# Patient Record
Sex: FEMALE | Race: WHITE | NOT HISPANIC OR LATINO | Employment: PART TIME | URBAN - NONMETROPOLITAN AREA
[De-identification: names, ages, dates, MRNs, and addresses within clinical notes are randomized per-mention and may not be internally consistent; named-entity substitution may affect disease eponyms.]

---

## 2017-03-09 ENCOUNTER — AMBULATORY - GICH (OUTPATIENT)
Dept: PHYSICAL THERAPY | Facility: OTHER | Age: 42
End: 2017-03-09

## 2017-03-09 DIAGNOSIS — M75.42 IMPINGEMENT SYNDROME OF LEFT SHOULDER: ICD-10-CM

## 2017-03-10 ENCOUNTER — HISTORY (OUTPATIENT)
Dept: FAMILY MEDICINE | Facility: OTHER | Age: 42
End: 2017-03-10

## 2017-03-10 ENCOUNTER — OFFICE VISIT - GICH (OUTPATIENT)
Dept: FAMILY MEDICINE | Facility: OTHER | Age: 42
End: 2017-03-10

## 2017-03-10 DIAGNOSIS — J34.89 OTHER SPECIFIED DISORDERS OF NOSE AND NASAL SINUSES: ICD-10-CM

## 2017-03-10 DIAGNOSIS — H92.01 OTALGIA OF RIGHT EAR: ICD-10-CM

## 2017-03-14 ENCOUNTER — HOSPITAL ENCOUNTER (OUTPATIENT)
Dept: PHYSICAL THERAPY | Facility: OTHER | Age: 42
Setting detail: THERAPIES SERIES
End: 2017-03-14

## 2017-03-14 DIAGNOSIS — M75.42 IMPINGEMENT SYNDROME OF LEFT SHOULDER: ICD-10-CM

## 2017-03-21 ENCOUNTER — HOSPITAL ENCOUNTER (OUTPATIENT)
Dept: PHYSICAL THERAPY | Facility: OTHER | Age: 42
Setting detail: THERAPIES SERIES
End: 2017-03-21

## 2017-03-31 ENCOUNTER — HOSPITAL ENCOUNTER (OUTPATIENT)
Dept: PHYSICAL THERAPY | Facility: OTHER | Age: 42
Setting detail: THERAPIES SERIES
End: 2017-03-31

## 2017-04-07 ENCOUNTER — HOSPITAL ENCOUNTER (OUTPATIENT)
Dept: PHYSICAL THERAPY | Facility: OTHER | Age: 42
Setting detail: THERAPIES SERIES
End: 2017-04-07

## 2017-05-16 ENCOUNTER — AMBULATORY - GICH (OUTPATIENT)
Dept: FAMILY MEDICINE | Facility: OTHER | Age: 42
End: 2017-05-16

## 2017-05-16 ENCOUNTER — HISTORY (OUTPATIENT)
Dept: FAMILY MEDICINE | Facility: OTHER | Age: 42
End: 2017-05-16

## 2017-05-16 ENCOUNTER — OFFICE VISIT - GICH (OUTPATIENT)
Dept: FAMILY MEDICINE | Facility: OTHER | Age: 42
End: 2017-05-16

## 2017-05-16 DIAGNOSIS — N63.0 BREAST LUMP: ICD-10-CM

## 2017-05-23 ENCOUNTER — HOSPITAL ENCOUNTER (OUTPATIENT)
Dept: RADIOLOGY | Facility: OTHER | Age: 42
End: 2017-05-23
Attending: FAMILY MEDICINE

## 2017-05-23 ENCOUNTER — HISTORY (OUTPATIENT)
Dept: RADIOLOGY | Facility: OTHER | Age: 42
End: 2017-05-23

## 2017-05-23 DIAGNOSIS — N63.0 BREAST LUMP: ICD-10-CM

## 2018-01-03 NOTE — PATIENT INSTRUCTIONS
Patient Information     Patient Name Yandy Lewis [id number] Female 1975      Patient Instructions by Sulma Lizarraga NP at 3/10/2017  3:45 PM     Author:  Sulma Lizarraga NP Service:  (none) Author Type:  PHYS- Nurse Practitioner     Filed:  3/10/2017  4:06 PM Encounter Date:  3/10/2017 Status:  Signed     :  Sulma Lizarraga NP (PHYS- Nurse Practitioner)            Nasacort daily   Warm packs a couple times daily  Follow up with primary care if symptoms are not improving

## 2018-01-03 NOTE — INITIAL ASSESSMENTS
Patient Information     Patient Name MRN Yandy Roberts [id number] Female 1975      Initial Assessments by Spring Navas PT at 3/14/2017  3:32 PM     Author:  Spring Navas PT Service:  (none) Author Type:  PT- Physical Therapist     Filed:  3/14/2017  5:07 PM Date of Service:  3/14/2017  3:32 PM Status:  Signed     :  Spring Navas PT (PT- Physical Therapist)            [facility]  Outpatient PT - Initial Evaluation  Upper Extremity    Date of Service: 3/14/2017     Visit #: 1 of 10    Patient Name: Yandy Light   YOB: 1975   Referring MD/Provider: Johnson Arvizu NP  Medical and Treatment Diagnosis: Left AC impingement  PT Treatment Diagnosis: left shoulder impingement, cervical restriction, dural tension, soft tissue hypomobility  Insurance: Other: IM Care  Start of Service: 17  Certification Dates: Start of Service: 17  Medicare/MA Re-Cert Due: 17     Living Situations:  Independent in Living Situation     Preadmission Functional Mobility: Independent   Precautions:  none  Cognition:  Oriented to Person, Place, and Time.     Were cultural / age or other special adaptations needed? 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 Has several children as well, one with spina bifida who takes some extra caring for which leaves less time for exercising for herself.    Rates pain: 0/10, 2/10 with certain movement, at times 6/10   Describes pain: achy sometimes, sharp shooting  Locates pain: lateral shoulder/ delt    Aggravating: child's pose/ overhead reach, reaching behind  Easing: rest/ avoiding motions      Current functional limitations: reaching behind  back, yoga poses    Prior Level:  Minimal to no difficulty completing the above functional activities.     Past Medical History      Diagnosis   Date     Allergic rhinitis, cause unspecified       Closed fracture of triquetral (cuneiform) bone of wrist       bilateral wrist fractures at age 17      Fracture of fifth toe, left, closed       History of pregnancy       Para 2-1-0-4  - twins at 34 weeks and 36 weeks      Other specified disorder of kidney and ureter       as child, had recurrent UTIs as a child      Severe pre-eclampsia, unspecified as to episode of care       HELLP syndrome.  Delivered at 33 weeks.      TUBAL LIGATION  2007     [facility] method      Unspecified hemorrhoids without mention of complication  2007     Past Surgical History       Procedure   Laterality Date      section   04      Section       Wrist fracture tx        Pr anes kidney prox ureter surg        Anesthesia alert        Notes prior complications from anesthesia: 'takes a long time to wake up       Past surgical history        Denies any history of surgical complications.         section   2007      at [facility], delivered at 36 weeks.  Planned due to one of the twins with coarctation of aorta & spina bifida       Tubal ligation        Lipectomy        with repair of diastasis recti       Occupation:      Previous Treatment:    Pain Meds / Anti-inflammatory Meds  - no  Physical Therapy - no  Injections - no  Surgery - no  Pain Clinic   No    Diagnostics:  Reviewed (see chart)   Current Medications:  Reviewed (see chart)    Drug Allergies:  Reviewed (see chart)  ?   Latex Allergy:  No     Objective    Items left blank indicate that the test was inappropriate or not meaningful at the time of evaluation and therefore not performed.    Fall Risk Screening: No risk factors identified    ROM / Strength:  Full range of motion and cardinal motion strength  5/5.   Posterior shoulder strength:   Lower trapezius 4-/5   Rhomboids 4+/5   Middle trapezius 4/5    Observation: Fair to good posterior scapular tilt with external rotation motion, increased pec minor tightness with this    Palpation: mildly increased restriction of posterior glenohumeral capsule, moderate restriction to right side glide at C5, median nerve ULTT positive    Special Tests: Positive  Negative  Comments:    Neer  x     Smith  x    Empty Can  x    Speed s  x Weaker but no pain   O Rashid s  x    Belly Press  x    Lift Off x  Lifting away hurt, not holding against resistance           Today s Interventions:   Evaluation  Patient education using model to answer her questions about pathology and anatomy regarding impingement syndrome    Intro to home exercise program     Home Exercise Program:    Access Code: [email or web address]   URL: [email or web address]   Date: 03/14/2017   Prepared by: Spring Navas     Exercises   Seated Upper Trapezius Stretch - 1-2 sets - 30 second hold - 1x daily   Standing Median Nerve Glide - 10 reps - 5 hold - 1x daily   Single Arm Doorway Pec Stretch at 60 Elevation - 2-3 sets - 30 second hold - 1x daily   Doorway Pec Stretch at 120 Degrees Abduction - 2-3 sets - 30 second hold - 1x daily       Assessment    Therapist Assessment / Clinical Impression:  Signs and symptoms consistent with left shoulder impingement with cervical hypomobility, dural restrictions, soft tissue restrictions and posterior shoulder weakness. She is unable to reach behind herself or perform her usual yoga routine without pain.    The patient is appropriate for physical therapy to address their pain, functional limitations, and physical impairments.      Functional Impairment(s): See subjective on initial evaluation and Functional Assessment / Summary Report from [facility].    Physical Impairment(s):  See above      Patient Specific Functional and Pain Scales (PSFS):    Clinician Instructions: Complete  after the history and before the exam.    Initial Assessment: We want to know what 3 activities in your life you are unable to perform, or are having the most difficulty performing, as a result of your chief problem. Please list and score at least 3 activities that you are unable to perform, or having the most difficulty performing, because of your chief problem.   Patient Specific Activity Scoring Scheme (score one number for each activity):   Activity Score (0-10)  0= Unable to perform activity  10= Able to perform activity at same level as before injury or problem   1. yoga 4/10   2. Reaching behind back 5/10   3.  /10   4.  /10   5.  /10   Totals:  9/20 = 45 % ability which relates to 55% impairment    Patient verbally states that they understand that the information they have provided above is current and complete to the best of their knowledge.    Patient Specific Functional Scale Modifier Scale Conversion: (patient's modifier that correlates with pt's score on PSFS): 5-CK (50% Impaired).    G codes and Modifier taken from pt completing a PSFS: completed at initial evaluation   Initial Primary G Code and Modifier:    Per the Patient's intake and/or assessment the Primary G Code is: Handling Objects .   The Patient's Impairment, Limitation or Restriction Modifier would be best described as: CK - 40% - 60% Impairment.     Goal Primary G Code and Modifier:    The Patient's G Code Goal would be: Handling Objects    The Patient's Impairment, Limitation or Restriction Modifier goal would be best described as: CI - 1% - 20% Impairment.         Goals:  Functional Short Term Goals (4 weeks):    Patient is to complete reaching activities with less than 3/10 pain consistently.   Patient is to report less than 2/10 pain during house work activities and minimal to no difficulty performing activities such as yoga routine.     Functional Long Term Goals (8 weeks):   Patient is to self-manage symptoms and return to  prior level of function.    Patient is to be independent in their Home Exercise Program.  Patient is to demonstrate improved posterior shoulder strength to 5-/5 to normalized scapulohumeral mechanics with minimal to no impingement pain.      Patient participated in goal selection and understand(s) the plan of care: Yes   Patient Potential for Achieving Desired Outcome: Good     Response to Intervention:  Good response to treatment     Plan    Treatment Plan:      Frequency:   16 visits    Duration of Treatment: 8 weeks    Planned Interventions:    Home Exercise Program development  Therapeutic Exercise (ROM & Strengthening)  Therapeutic Activities  Neuromuscular Re-education  Manual Therapy  Ultrasound  Hot Packs / Cold Pack Modalities  Vasopneumatic Compression with Cold Therapy ( [employer] )  Phonophoresis with Ketoprofen  Iontophoresis with Dexamethasone  Mechanical Traction    Plan for next visit:  Posterior shoulder strength added to home exercise program, cervical side gliding and assess ribs and thoracic spine    Thank you for your referral to [facility].  Please call with any questions, concerns or comments.  [phone]  Spring Navas PT, DPT    The signature, of the referring medical provider, on this document indicates certification of the above prescribed plan of care and is medically necessary.    X____________________________________________________    Physician Signature                     Date  Time

## 2018-01-03 NOTE — PROGRESS NOTES
Patient Information     Patient Name MRN Sex Yandy Modi [id number] Female 1975      Progress Notes by Sulma Lizarraga NP at 3/10/2017  3:45 PM     Author:  Sulma Lizarraga NP Service:  (none) Author Type:  PHYS- Nurse Practitioner     Filed:  3/10/2017  5:58 PM Encounter Date:  3/10/2017 Status:  Signed     :  Sulma Lizarraga NP (PHYS- Nurse Practitioner)            HPI:    Yandy Light is a 41 y.o. female who presents to clinic today for ear pain. She has had pressure to right ear. Having some vertigo at times and headaches. She thinks this started with opening her jaw too wide about 1 month ago. Since then sx progressed. Having plugged feeling in ear. No ear pain. Feels tired. Has some sinus pressure but no sinus drainage, congestion. Taking ibuprofen for sx. No recent cold sx. No hx of ear issues.     Past Medical History      Diagnosis   Date     Allergic rhinitis, cause unspecified       Closed fracture of triquetral (cuneiform) bone of wrist       bilateral wrist fractures at age 17      Fracture of fifth toe, left, closed       History of pregnancy       Para 2-1-0-4  - twins at 34 weeks and 36 weeks      Other specified disorder of kidney and ureter       as child, had recurrent UTIs as a child      Severe pre-eclampsia, unspecified as to episode of care       HELLP syndrome.  Delivered at 33 weeks.      TUBAL LIGATION  2007     [facility] method      Unspecified hemorrhoids without mention of complication  2007     Past Surgical History       Procedure   Laterality Date      section   04      Section       Wrist fracture tx        Pr anes kidney prox ureter surg   1980     Anesthesia alert        Notes prior complications from anesthesia: 'takes a long time to wake up       Past surgical history        Denies any history of surgical complications.         section   2007      at [facility], delivered at 36 weeks.   Planned due to one of the twins with coarctation of aorta & spina bifida       Tubal ligation        Lipectomy   2009     with repair of diastasis recti       Social History       Substance Use Topics         Smoking status:   Never Smoker     Smokeless tobacco:   Never Used     Alcohol use   Yes      Comment: 3 per month      Current Outpatient Prescriptions       Medication  Sig Dispense Refill     doxycycline (VIBRAMYCIN) 100 mg capsule Take 2 tabs PO at one time for one dose 2 capsule 0     Multivitamin,Tx-Iron-Minerals (MULTILEX-T&M) Tab Take  by mouth.       No current facility-administered medications for this visit.      Medications have been reviewed by me and are current to the best of my knowledge and ability.    No Known Allergies    ROS:  Pertinent positives and negatives are noted in HPI.    EXAM:  General appearance: well appearing female, in no acute distress  Head: normocephalic, atraumatic  Ears: TM's with cone of light, no erythema, canals clear bilaterally  Eyes: conjunctivae normal  Orophayrnx: moist mucous membranes, tonsils without erythema, exudates or petechiae, no post nasal drip seen  Neck: supple without adenopathy  Respiratory: clear to auscultation bilaterally  Cardiac: RRR with no murmurs  Psychological: normal affect, alert and pleasant    ASSESSMENT/PLAN:    ICD-10-CM    1. Otalgia, right H92.01 triamcinolone, 55 mcg each actuation, nasal (NASACORT) 55 mcg nasal spray   2. Sinus pressure J34.89 triamcinolone, 55 mcg each actuation, nasal (NASACORT) 55 mcg nasal spray   Discussed varying causes of ear pain. No infection today. Will do trial of nasacort and f/u with PCP next week if sx persist. All questions were answered and she is in agreement with plan.     Patient Instructions   Nasacort daily   Warm packs a couple times daily  Follow up with primary care if symptoms are not improving

## 2018-01-03 NOTE — PROGRESS NOTES
Patient Information     Patient Name MRN Sex Yandy Modi [id number] Female 1975      Progress Notes by Spring Navas PT at 3/21/2017  3:16 PM     Author:  Spring Navas PT Service:  (none) Author Type:  PT- Physical Therapist     Filed:  3/22/2017  9:21 AM Date of Service:  3/21/2017  3:16 PM Status:  Signed     :  Spring Navas PT (PT- Physical Therapist)            [facility]  Outpatient PT - Daily note  Date of Service: 3/21/2017     Visit #: 2 of 10    Patient Name: Yandy Light   YOB: 1975   Referring MD/Provider: Johnson Arvizu NP  Medical and Treatment Diagnosis: Left AC impingement  PT Treatment Diagnosis: left shoulder impingement, cervical restriction, dural tension, soft tissue hypomobility  Insurance: Other: IM Care  Start of Service: 17  Certification Dates: Start of Service: 17  Medicare/MA Re-Cert Due: 17       Subjective      Had a deep tissue massage Friday and was pretty sore. No problems with the home exercise program. Can reach farther before getting the same impingement pain.     Objective  Good rib mobility, restricted mid thoracic extension and rotation    Today s Interventions:   Prone thoracic mobilization emphasizing unilateral PA for rotation upper T spine, 1 cavitation around T4  Prone Y's, external rotation in 90 degrees (clicking noted at AC)  Standing Y's 2# weight * x15    Resisted external rotation in 90 degrees abduction red theraband caused clicking at AC    external rotation plus scapular set green theraband x15 *    Supine glenohumeral mobilization, median nerve glides, soft tissue mobilization UBN, cervical side gliding and rotation x15 mins total    Home Exercise Program:    Access Code: [email or web address]   URL: [email or web address]   Date: 2017   Prepared by: Spring Navas     Exercises   Seated Upper Trapezius Stretch - 1-2 sets - 30 second hold - 1x daily   Standing Median Nerve  Glide - 10 reps - 5 hold - 1x daily   Single Arm Doorway Pec Stretch at 60 Elevation - 2-3 sets - 30 second hold - 1x daily   Doorway Pec Stretch at 120 Degrees Abduction - 2-3 sets - 30 second hold - 1x daily   Shoulder External Rotation and Scapular Retraction with Resistance - 15-20 reps - 1-2 sets - 5-10 seconds hold - 1x daily   Prone Single Arm Shoulder Y - 10-15 reps - 1-2 sets - 5 hold - 1x daily       Assessment    Patient demonstrates ability to perform posterior shoulder strengthening with some exercises causing mild instability at AC joint. Dural tension is improved, as well as soft tissue restrictions and cervical mobility.    The patient is appropriate for physical therapy to address their pain, functional limitations, and physical impairments.    Patient Specific Functional and Pain Scales (PSFS):    Clinician Instructions: Complete after the history and before the exam.    Initial Assessment: We want to know what 3 activities in your life you are unable to perform, or are having the most difficulty performing, as a result of your chief problem. Please list and score at least 3 activities that you are unable to perform, or having the most difficulty performing, because of your chief problem.   Patient Specific Activity Scoring Scheme (score one number for each activity):   Activity Score (0-10)  0= Unable to perform activity  10= Able to perform activity at same level as before injury or problem   1. yoga 4/10   2. Reaching behind back 5/10   3.  /10   4.  /10   5.  /10   Totals:  9/20 = 45 % ability which relates to 55% impairment    Patient verbally states that they understand that the information they have provided above is current and complete to the best of their knowledge.    Patient Specific Functional Scale Modifier Scale Conversion: (patient's modifier that correlates with pt's score on PSFS): 5-CK (50% Impaired).    G codes and Modifier taken from pt completing a PSFS: completed at initial  evaluation   Initial Primary G Code and Modifier:    Per the Patient's intake and/or assessment the Primary G Code is: Handling Objects .   The Patient's Impairment, Limitation or Restriction Modifier would be best described as: CK - 40% - 60% Impairment.     Goal Primary G Code and Modifier:    The Patient's G Code Goal would be: Handling Objects    The Patient's Impairment, Limitation or Restriction Modifier goal would be best described as: CI - 1% - 20% Impairment.         Goals:  Functional Short Term Goals (4 weeks):    Patient is to complete reaching activities with less than 3/10 pain consistently.   Patient is to report less than 2/10 pain during house work activities and minimal to no difficulty performing activities such as yoga routine.     Functional Long Term Goals (8 weeks):   Patient is to self-manage symptoms and return to prior level of function.    Patient is to be independent in their Home Exercise Program.  Patient is to demonstrate improved posterior shoulder strength to 5-/5 to normalized scapulohumeral mechanics with minimal to no impingement pain.      Plan    Treatment Plan:      Frequency:   16 visits    Duration of Treatment: 8 weeks      Plan for next visit:  Posterior shoulder strength progression, manual therapy as needed.    Thank you for your referral to [facility].  Please call with any questions, concerns or comments.  [phone]  Spring Navas PT, DPT

## 2018-01-03 NOTE — NURSING NOTE
"Patient Information     Patient Name MRN Yandy Roberts [id number] Female 1975      Nursing Note by Joni Zambrano LPN at 3/10/2017  3:45 PM     Author:  Joni Zambrano LPN  Service:  (none) Author Type:  NURS- Licensed Practical Nurse     Filed:  3/10/2017  3:51 PM  Encounter Date:  3/10/2017 Status:  Addendum     :  Joni Zambrano LPN (NURS- Licensed Practical Nurse)        Related Notes: Original Note by Joni Zambrano LPN (NURS- Licensed Practical Nurse) filed at 3/10/2017  3:49 PM            Patient presents to the clinic for right ear pain. Patient states it doesn't hurt but she feels \"dizzy\". Has had vertigo in the past. Complains of a constant headache and states everything started last .  Joni Zambrano LPN ..............3/10/2017 3:49 PM          "

## 2018-01-04 NOTE — PROGRESS NOTES
Patient Information     Patient Name MRN Sex Yandy Modi [id number] Female 1975      Progress Notes by Spring Navas PT at 3/31/2017 11:17 AM     Author:  Spring Navas PT Service:  (none) Author Type:  PT- Physical Therapist     Filed:  3/31/2017 12:40 PM Date of Service:  3/31/2017 11:17 AM Status:  Signed     :  Spring Navas PT (PT- Physical Therapist)            [facility]  Outpatient PT - Daily note  Date of Service: 3/31/2017     Visit #: 3 of 10    Patient Name: Yandy Light   YOB: 1975   Referring MD/Provider: Johnson Arvizu NP  Medical and Treatment Diagnosis: Left AC impingement  PT Treatment Diagnosis: left shoulder impingement, cervical restriction, dural tension, soft tissue hypomobility  Insurance: Other: IM Care  Start of Service: 17  Certification Dates: Start of Service: 17  Medicare/MA Re-Cert Due: 17       Subjective      Still can have pain at times, but can reach farther behind her before getting the same impingement pain. Hasn't been as faithful with the new strength exercises the past week due to children's illnesses.    Objective    Today s Interventions:   Supine fascial release pectorals with cervical flexion and side bend right x3 mins  Supine glenohumeral mobilization, median nerve glides, soft tissue mobilization UBN, cervical side gliding and rotation x15 mins total    Side lying pec stretch. Thoracic reach and roll x10    Prone pec stretch into horizontal abduction with dural gliding 2 x10 reps  Prone thoracic mobilization emphasizing unilateral PA for rotation upper T spine  Prone Y's no weight x15  Prone shoulder extension palm up 5 sec holds x15    Standing Y's yellow theraband resistance * x15.  Standing shoulder circles at shoulder height 2# weight. Instructed patient on visual and manual feedback to prevent UT use.    COLTEN adduction 3 kg to fatigue    Chest press 50# x15  Low trapezius scapular  depression on seated dip machine 100# x15    Attempted lat stretch in doorway with minimal pull despite cuing so DC  Counter push ups with a plus minimally fatiguing so DC    Home Exercise Program:    Access Code: [email or web address]   URL: [email or web address]   Date: 03/14/2017   Prepared by: Spring Navas     Exercises   Seated Upper Trapezius Stretch - 1-2 sets - 30 second hold - 1x daily   Standing Median Nerve Glide - 10 reps - 5 hold - 1x daily   Single Arm Doorway Pec Stretch at 60 Elevation - 2-3 sets - 30 second hold - 1x daily   Doorway Pec Stretch at 120 Degrees Abduction - 2-3 sets - 30 second hold - 1x daily   Shoulder External Rotation and Scapular Retraction with Resistance - 15-20 reps - 1-2 sets - 5-10 seconds hold - 1x daily   Prone Single Arm Shoulder Y - 10-15 reps - 1-2 sets - 5 hold - 1x daily       Assessment    Patient demonstrates ability to progress posterior shoulder strengthening.. Dural tension is improved, as well as soft tissue restrictions and cervical mobility.    The patient is appropriate for physical therapy to address their pain, functional limitations, and physical impairments.    Patient Specific Functional and Pain Scales (PSFS):    Clinician Instructions: Complete after the history and before the exam.    Initial Assessment: We want to know what 3 activities in your life you are unable to perform, or are having the most difficulty performing, as a result of your chief problem. Please list and score at least 3 activities that you are unable to perform, or having the most difficulty performing, because of your chief problem.   Patient Specific Activity Scoring Scheme (score one number for each activity):   Activity Score (0-10)  0= Unable to perform activity  10= Able to perform activity at same level as before injury or problem   1. yoga 4/10   2. Reaching behind back 5/10   3.  /10   4.  /10   5.  /10   Totals:  9/20 = 45 % ability which relates to 55% impairment     Patient verbally states that they understand that the information they have provided above is current and complete to the best of their knowledge.    Patient Specific Functional Scale Modifier Scale Conversion: (patient's modifier that correlates with pt's score on PSFS): 5-CK (50% Impaired).    G codes and Modifier taken from pt completing a PSFS: completed at initial evaluation   Initial Primary G Code and Modifier:    Per the Patient's intake and/or assessment the Primary G Code is: Handling Objects .   The Patient's Impairment, Limitation or Restriction Modifier would be best described as: CK - 40% - 60% Impairment.     Goal Primary G Code and Modifier:    The Patient's G Code Goal would be: Handling Objects    The Patient's Impairment, Limitation or Restriction Modifier goal would be best described as: CI - 1% - 20% Impairment.         Goals:  Functional Short Term Goals (4 weeks):    Patient is to complete reaching activities with less than 3/10 pain consistently.   Patient is to report less than 2/10 pain during house work activities and minimal to no difficulty performing activities such as yoga routine.     Functional Long Term Goals (8 weeks):   Patient is to self-manage symptoms and return to prior level of function.    Patient is to be independent in their Home Exercise Program.  Patient is to demonstrate improved posterior shoulder strength to 5-/5 to normalized scapulohumeral mechanics with minimal to no impingement pain.      Plan    Treatment Plan:      Frequency:   16 visits    Duration of Treatment: 8 weeks      Plan for next visit:  Posterior shoulder strength progression, manual therapy as needed.    Thank you for your referral to [facility].  Please call with any questions, concerns or comments.  [phone]  Spring Navas, PT, DPT

## 2018-01-04 NOTE — PROGRESS NOTES
Patient Information     Patient Name MRN Sex Yandy Modi [id number] Female 1975      Progress Notes by Spring Navas PT at 2017  3:58 PM     Author:  Spring Navas PT Service:  (none) Author Type:  PT- Physical Therapist     Filed:  2017  4:46 PM Date of Service:  2017  3:58 PM Status:  Signed     :  Spring Navas PT (PT- Physical Therapist)            [facility]  Outpatient PT - Daily note  Date of Service: 2017     Visit #: 4 of 10    Patient Name: Yandy Light   YOB: 1975   Referring MD/Provider: Johnson Arvizu NP  Medical and Treatment Diagnosis: Left AC impingement  PT Treatment Diagnosis: left shoulder impingement, cervical restriction, dural tension, soft tissue hypomobility  Insurance: Other:  Care  Start of Service: 17  Certification Dates: Start of Service: 17  Medicare/MA Re-Cert Due: 17       Subjective      Patient reports 25% improvement with the pain and shoulder strength overall, but flexibility is much better.    Objective    Today s Interventions:   Supine fascial release pectorals with cervical flexion and side bend right x3 mins  Supine glenohumeral mobilization, median nerve glides, soft tissue mobilization UBN, cervical side gliding and rotation x10 mins total  AP glides left side lower cervicals grade 3 x2 mins. Caused some referral pain into shoulder but improved mobility following.    Prone pec stretch into horizontal abduction with dural gliding 2 x10 reps  Prone thoracic mobilization emphasizing unilateral PA for rotation upper T spine  Prone external rotation in 90 degrees abduction 1# weight x20. No clicking at AC until patient became very fatigued at end of set, improved since previous visits.  Prone shoulder extension palm up 5 sec holds x15    COLTEN adduction 3 kg to fatigue    Deferred:  Chest press 50# x15  Low trapezius scapular depression on seated dip machine 100# x15  Side lying  pec stretch. Thoracic reach and roll x10    Home Exercise Program:    Access Code: [email or web address] URL: [email or web address] Date: 04/07/2017 Prepared by: Spring Navas   Exercises   Seated Upper Trapezius Stretch - 1-2 sets - 30 second hold - 1x daily   Standing Median Nerve Glide - 10 reps - 5 hold - 1x daily   Single Arm Doorway Pec Stretch at 60 Elevation - 2-3 sets - 30 second hold - 1x daily   Doorway Pec Stretch at 120 Degrees Abduction - 2-3 sets - 30 second hold - 1x daily   Shoulder External Rotation and Scapular Retraction with Resistance - 15-20 reps - 1-2 sets - 5-10 seconds hold - 1x daily   Shoulder PNF D2 with Resistance - 10-15 reps - 1-2 sets - 5 seconds hold - 1x daily   Standing Circles with 2# weight - 1-2 sets - 30-60 seconds hold - 1x daily     Assessment    Patient demonstrates ability to progress posterior shoulder strengthening with less instability at AC joint. Dural tension is improved, as well as soft tissue restrictions and cervical mobility.    The patient is appropriate for physical therapy to address their pain, functional limitations, and physical impairments.    Patient Specific Functional and Pain Scales (PSFS):    Clinician Instructions: Complete after the history and before the exam.    Initial Assessment: We want to know what 3 activities in your life you are unable to perform, or are having the most difficulty performing, as a result of your chief problem. Please list and score at least 3 activities that you are unable to perform, or having the most difficulty performing, because of your chief problem.   Patient Specific Activity Scoring Scheme (score one number for each activity):   Activity Score (0-10)  0= Unable to perform activity  10= Able to perform activity at same level as before injury or problem   1. yoga 4/10   2. Reaching behind back 5/10   3.  /10   4.  /10   5.  /10   Totals:  9/20 = 45 % ability which relates to 55% impairment    Patient verbally states  that they understand that the information they have provided above is current and complete to the best of their knowledge.    Patient Specific Functional Scale Modifier Scale Conversion: (patient's modifier that correlates with pt's score on PSFS): 5-CK (50% Impaired).    G codes and Modifier taken from pt completing a PSFS: completed at initial evaluation   Initial Primary G Code and Modifier:    Per the Patient's intake and/or assessment the Primary G Code is: Handling Objects .   The Patient's Impairment, Limitation or Restriction Modifier would be best described as: CK - 40% - 60% Impairment.     Goal Primary G Code and Modifier:    The Patient's G Code Goal would be: Handling Objects    The Patient's Impairment, Limitation or Restriction Modifier goal would be best described as: CI - 1% - 20% Impairment.         Goals:  Functional Short Term Goals (4 weeks):    Patient is to complete reaching activities with less than 3/10 pain consistently.   Patient is to report less than 2/10 pain during house work activities and minimal to no difficulty performing activities such as yoga routine.     Functional Long Term Goals (8 weeks):   Patient is to self-manage symptoms and return to prior level of function.    Patient is to be independent in their Home Exercise Program.  Patient is to demonstrate improved posterior shoulder strength to 5-/5 to normalized scapulohumeral mechanics with minimal to no impingement pain.      Plan    Treatment Plan:      Frequency:   16 visits    Duration of Treatment: 8 weeks      Plan for next visit:  Posterior shoulder strength progression, manual therapy as needed.    Thank you for your referral to [facility].  Please call with any questions, concerns or comments.  [phone]  Spring Navas, PT, DPT

## 2018-01-05 NOTE — PROGRESS NOTES
Patient Information     Patient Name MRN Sex Yandy Modi [id number] Female 1975      Progress Notes by Heaven Whitley MD at 2017  3:30 PM     Author:  Heaven Whitley MD Service:  (none) Author Type:  Physician     Filed:  2017  3:47 PM Encounter Date:  2017 Status:  Signed     :  Heaven Whitley MD (Physician)            SUBJECTIVE:    Yandy Light is a 41 y.o. female who presents about tenderness in right breast on about  and then noted a small lump. She started having menses yesterday so this was present before period and has persisted. Has never had a mammogram. Breast fed all of children. Family history of breast cancer in [facility] at about age 50. She does not regularly perform self breast exams.    HPI    No Known Allergies,   Current Outpatient Prescriptions:      Multivitamin,Tx-Iron-Minerals (MULTILEX-T&M) Tab, Take  by mouth., Disp: , Rfl:   Medications have been reviewed by me and are current to the best of my knowledge and ability. and   Patient Active Problem List      Diagnosis Date Noted     BACK PAIN, THORACIC REGION 08/10/2012     ALLERGIC RHINITIS      ACID REFLUX DISEASE      Unspecified hemorrhoids without mention of complication 2007     Social History     Social History        Marital status:       Spouse name: N/A     Number of children:  N/A     Years of education:  N/A     Occupational History       Dental Hygentist [employer]     Social History Main Topics          Smoking status:   Never Smoker      Smokeless tobacco:   Never Used      Alcohol use   Yes      Comment: 3 per month       Drug use:   No      Sexual activity:   Yes      Partners:  Male      Birth control/ protection:  Surgical      Other Topics  Concern      Service No     Blood Transfusions No     Caffeine Concern No     Occupational Exposure No     Hobby Hazards No     Sleep Concern No     Stress Concern No     Weight Concern No      Special Diet No     Back Care No     Exercise No     Bike Helmet No     Seat Belt Yes     Self-Exams Yes     Social History Narrative      Rm.       , dental assistant working part time.      Two sets of twins Veda Leon, has spina bifida and Schones syndrome, 2007, Rober and Jesus, 2004.             REVIEW OF SYSTEMS:  ROS    OBJECTIVE:  BP 98/78  Pulse 68  Wt 64.9 kg (143 lb)  LMP 05/15/2017 (Exact Date)  BMI 28.08 kg/m2    EXAM:   Physical Exam   Constitutional: She is well-developed, well-nourished, and in no distress. No distress.   Pulmonary/Chest:   Breasts are symmetric on inspection in upright position with no evidence of dimpling or skin retractions or visible masses. On palpation she has very fibronodular breast tissue in the upper outer quadrants of both breasts. On the right side there is a super fissural nodularity that is actually part of this fibrocystic area and is not distinctly a separate mass.   Nursing note and vitals reviewed.      ASSESSMENT/PLAN:    ICD-10-CM    1. Lump of right breast N63 XR MAMMO BILAT DIAGNOSTIC        Plan:    Diagnostic mammography will be completed and ultrasound if needed.  Reminded patient that she is overdue for Pap and general exam since the last time she had a Pap was 2013.  Heaven Whitley MD  3:47 PM 5/16/2017

## 2018-01-05 NOTE — PATIENT INSTRUCTIONS
Patient Information     Patient Name Yandy Lewis [id number] Female 1975      Patient Instructions by Heaven Whitley MD at 2017  3:38 PM     Author:  Heaven Whitley MD  Service:  (none) Author Type:  Physician     Filed:  2017  3:38 PM  Encounter Date:  2017 Status:  Addendum     :  Heaven Whitley MD (Physician)        Related Notes: Original Note by Heaven Whitley MD (Physician) filed at 2017  3:38 PM               Index [place] Related topics   Fibrocystic Breast Changes   ________________________________________________________________________  KEY POINTS    Fibrocystic breast changes are lumpy areas in your breasts that are not caused by cancer. The lumps may be pockets of fluid, called cysts, or solid lumps of fibrous (thickened) tissue.    Large or painful cysts may be treated with aspiration. Your healthcare provider may prescribe birth control pills to reduce hormone changes caused by your menstrual period.    Talk to your healthcare provider about the benefits of doing regular breast self-exams. It is important to know how your breasts normally look and feel so that you can report any changes to your provider.  ________________________________________________________________________  What are fibrocystic breast changes?  Fibrocystic breast changes are lumpy areas in your breasts that are not caused by cancer. The lumps may be pockets of fluid, called cysts, or solid lumps of fibrous (thickened) tissue. These changes are very common in women between the ages of 20 and 50.   What is the cause?  The cause of fibrocystic breast changes is not known. Estrogen and other hormones made by the ovaries may play a role.  What are the symptoms?   Symptoms may include:    Breast pain or tenderness    Lumpiness in one or both breasts  These changes usually happen in both breasts 7 to 10 days before your menstrual period. They begin to go away when your  period starts and are usually gone by the time your period ends. The lumps may go away or be less noticeable after menopause.   How is it diagnosed?  Your healthcare provider will ask about your symptoms and medical history and examine you. Your healthcare provider may ask that you return for another exam in 2 to 6 weeks, depending on where you are in your menstrual cycle.  Tests may include:    A mammogram, which is an X-ray of the breast    Ultrasound, which uses sound waves to show pictures of the breast    Removal and testing of fluid from a cyst with a tiny needle  How is it treated?   To relieve breast pain, your provider may recommend that you:    Take nonprescription pain medicine, such as acetaminophen, ibuprofen, or naproxen. Read the label and take as directed. Unless recommended by your healthcare provider, you should not take these medicines for more than 10 days.    Nonsteroidal anti-inflammatory medicines (NSAIDs), such as ibuprofen, naproxen, and aspirin, may cause stomach bleeding and other problems. These risks increase with age.    Acetaminophen may cause liver damage or other problems. Unless recommended by your provider, don't take more than 3000 milligrams (mg) in 24 hours. To make sure you don t take too much, check other medicines you take to see if they also contain acetaminophen. Ask your provider if you need to avoid drinking alcohol while taking this medicine.    Wear a well-fitting bra for support, especially when you are physically active.    Use ice packs or heat to reduce or prevent symptoms.    Put an ice pack, gel pack, or package of frozen vegetables wrapped in a cloth on your breast every 3 to 4 hours for up to 20 minutes at a time.    Put warm moist cloths, a hot water bottle, or heating pad on your breast. Cover the hot water bottle with a towel or set the heating pad on low so you don t burn your skin.    Cut back on caffeine, such as coffee, tea, cola, and chocolate for a few  months. Some women find their pain is less when they reduce or stop caffeine.  Your healthcare provider may prescribe birth control pills to reduce hormone changes caused by your menstrual period.  Large or painful cysts may be treated with aspiration. In this procedure, you are given a shot of medicine to numb the area and then fluid in the lump is removed with a needle and syringe.   How can I take care of myself?  Have a yearly exam by your healthcare provider and get regular screening mammograms as advised by your provider.  Talk to your healthcare provider about the benefits of doing regular breast self-exams. It is important to know how your breasts normally look and feel so that you can report any changes to your provider.  Follow the full course of treatment prescribed by your healthcare provider. Ask your provider:    How and when you will get your test results    How long it will take to recover    If there are activities you should avoid and when you can return to your normal activities    How to take care of yourself at home    What symptoms or problems you should watch for and what to do if you have them  Make sure you know when you should come back for a checkup. Keep all appointments for provider visits or tests.  Developed by [employer].  Adult Advisor 2016.3 published by [employer].  Last modified: 2016-06-02  Last reviewed: 2014-12-11  This content is reviewed periodically and is subject to change as new health information becomes available. The information is intended to inform and educate and is not a replacement for medical evaluation, advice, diagnosis or treatment by a healthcare professional.  References   Adult Advisor 2016.3 Index    Copyright   2016 [employer], a division of [employer] All rights reserved.

## 2018-01-16 PROBLEM — K21.9 ACID REFLUX DISEASE: Status: ACTIVE | Noted: 2018-01-16

## 2018-01-16 PROBLEM — J30.9 ALLERGIC RHINITIS: Status: ACTIVE | Noted: 2018-01-16

## 2018-01-26 VITALS
DIASTOLIC BLOOD PRESSURE: 80 MMHG | SYSTOLIC BLOOD PRESSURE: 105 MMHG | BODY MASS INDEX: 28.16 KG/M2 | HEIGHT: 60 IN | HEART RATE: 60 BPM | WEIGHT: 143.4 LBS | TEMPERATURE: 97.2 F

## 2018-01-26 VITALS
HEART RATE: 68 BPM | BODY MASS INDEX: 28.07 KG/M2 | SYSTOLIC BLOOD PRESSURE: 98 MMHG | DIASTOLIC BLOOD PRESSURE: 78 MMHG | WEIGHT: 143 LBS

## 2018-02-14 ENCOUNTER — OFFICE VISIT (OUTPATIENT)
Dept: FAMILY MEDICINE | Facility: OTHER | Age: 43
End: 2018-02-14
Attending: NURSE PRACTITIONER
Payer: COMMERCIAL

## 2018-02-14 VITALS
HEIGHT: 59 IN | TEMPERATURE: 98.3 F | HEART RATE: 64 BPM | RESPIRATION RATE: 14 BRPM | WEIGHT: 144.2 LBS | DIASTOLIC BLOOD PRESSURE: 70 MMHG | SYSTOLIC BLOOD PRESSURE: 116 MMHG | BODY MASS INDEX: 29.07 KG/M2

## 2018-02-14 DIAGNOSIS — R09.81 NASAL SINUS CONGESTION: Primary | ICD-10-CM

## 2018-02-14 DIAGNOSIS — J00 ACUTE NASOPHARYNGITIS: ICD-10-CM

## 2018-02-14 PROCEDURE — 99213 OFFICE O/P EST LOW 20 MIN: CPT | Performed by: NURSE PRACTITIONER

## 2018-02-14 PROCEDURE — G0463 HOSPITAL OUTPT CLINIC VISIT: HCPCS

## 2018-02-14 ASSESSMENT — PAIN SCALES - GENERAL: PAINLEVEL: NO PAIN (0)

## 2018-02-14 NOTE — MR AVS SNAPSHOT
After Visit Summary   2/14/2018    Yandy Light    MRN: [id number]           Patient Information     Date Of Birth          1975        Visit Information        Provider Department      2/14/2018 12:15 PM Savita Gutierrez NP [facility]        Care Instructions      Sinusitis (No Antibiotics)    The sinuses are air-filled spaces within the bones of the face. They connect to the inside of the nose. Sinusitis is an inflammation of the tissue lining the sinus cavity. Sinus inflammation can occur during a cold. It can also be due to allergies to pollens and other particles in the air. It can cause symptoms such as sinus congestion, headache, sore throat, facial swelling and fullness. It may also cause a low-grade fever. No infection is present, and no antibiotic treatment is needed.  Home care    Drink plenty of water, hot tea, and other liquids. This may help thin mucus. It also may promote sinus drainage.    Heat may help soothe painful areas of the face. Use a towel soaked in hot water. Or,  the shower and direct the hot spray onto your face. Using a vaporizer along with a menthol rub at night may also help.     An expectorant containing guaifenesin may help thin the mucus and promote drainage from the sinuses.    Over-the-counter decongestants may be used unless a similar medicine was prescribed. Nasal sprays work the fastest. Use one that contains phenylephrine or oxymetazoline. First blow the nose gently. Then use the spray. Do not use these medicines more often than directed on the label or symptoms may get worse. You may also use tablets containing pseudoephedrine. Avoid products that combine ingredients, because side effects may be increased. Read labels. You can also ask the pharmacist for help. (NOTE: Persons with high blood pressure should not use decongestants. They can raise blood pressure.)    Over-the-counter antihistamines may help if allergies  contributed to your sinusitis.      Use acetaminophen or ibuprofen to control pain, unless another pain medicine was prescribed. (If you have chronic liver or kidney disease or ever had a stomach ulcer, talk with your doctor before using these medicines. Aspirin should never be used in anyone under 18 years of age who is ill with a fever. It may cause severe liver damage.)    Use nasal rinses or irrigation as instructed by your health care provider.    Don't smoke. This can worsen symptoms.  Follow-up care  Follow up with your healthcare provider or our staff if you are not improving within the next week.  When to seek medical advice  Call your healthcare provider if any of these occur:    Green or yellow discharge from the nose or into the throat    Facial pain or headache becoming more severe    Stiff neck    Unusual drowsiness or confusion    Swelling of the forehead or eyelids    Vision problems, including blurred or double vision    Fever of 100.4 F (38 C) or higher, or as directed by your healthcare provider    Seizure    Breathing problems    Symptoms not resolving within 10 days  Date Last Reviewed: 4/13/2015 2000-2017 [employer]. [address] [place] [address]. All rights reserved. This information is not intended as a substitute for professional medical care. Always follow your healthcare professional's instructions.                Follow-ups after your visit        Who to contact     If you have questions or need follow up information about today's clinic visit or your schedule please contact [facility] directly at [phone].  Normal or non-critical lab and imaging results will be communicated to you by MyChart, letter or phone within 4 business days after the clinic has received the results. If you do not hear from us within 7 days, please contact the clinic through MyChart or phone. If you have a critical or abnormal lab result, we will notify you by  "phone as soon as possible.  Submit refill requests through [employer] or call your pharmacy and they will forward the refill request to us. Please allow 3 business days for your refill to be completed.          Additional Information About Your Visit        [employer] Information     [employer] lets you send messages to your doctor, view your test results, renew your prescriptions, schedule appointments and more. To sign up, go to [email or web address] . Click on \"Log in\" on the left side of the screen, which will take you to the Welcome page. Then click on \"Sign up Now\" on the right side of the page.     You will be asked to enter the access code listed below, as well as some personal information. Please follow the directions to create your username and password.     Your access code is: [email or web address]  Expires: 5/15/2018  2:24 PM     Your access code will  in 90 days. If you need help or a new code, please call your [place] clinic or [phone].        Care EveryWhere ID     This is your Care EveryWhere ID. This could be used by other organizations to access your [place] medical records  [id number]        Your Vitals Were     Pulse Temperature Respirations Height BMI (Body Mass Index)       64 98.3  F (36.8  C) (Tympanic) 14 4' 11\" (1.499 m) 29.12 kg/m2        Blood Pressure from Last 3 Encounters:   18 116/70   17 98/78   03/10/17 105/80    Weight from Last 3 Encounters:   18 144 lb 3.2 oz (65.4 kg)   17 143 lb (64.9 kg)   03/10/17 143 lb 6.4 oz (65 kg)              Today, you had the following     No orders found for display       Primary Care Provider Fax #    Physician No Ref-Primary [phone]       No address on file        Equal Access to Services     BARRON ENAMORADO : Cody Adam, nataliia lucio, qaaruna kayumiko wilder, terrance vigil. [facility] [phone].    ATENCIÓN: Si habla español, tiene a horta disposición servicios gratuitos de " shady lingüísticrhys. Delilah al [phone].    We comply with applicable federal civil rights laws and [place] laws. We do not discriminate on the basis of race, color, national origin, age, disability, sex, sexual orientation, or gender identity.            Thank you!     Thank you for choosing [facility]  for your care. Our goal is always to provide you with excellent care. Hearing back from our patients is one way we can continue to improve our services. Please take a few minutes to complete the written survey that you may receive in the mail after your visit with us. Thank you!             Your Updated Medication List - Protect others around you: Learn how to safely use, store and throw away your medicines at www.disposemymeds.org.          This list is accurate as of 2/14/18  2:24 PM.  Always use your most recent med list.                   Brand Name Dispense Instructions for use Diagnosis    MULTILEX-T&M Tabs

## 2018-02-14 NOTE — PATIENT INSTRUCTIONS
Sinusitis (No Antibiotics)    The sinuses are air-filled spaces within the bones of the face. They connect to the inside of the nose. Sinusitis is an inflammation of the tissue lining the sinus cavity. Sinus inflammation can occur during a cold. It can also be due to allergies to pollens and other particles in the air. It can cause symptoms such as sinus congestion, headache, sore throat, facial swelling and fullness. It may also cause a low-grade fever. No infection is present, and no antibiotic treatment is needed.  Home care    Drink plenty of water, hot tea, and other liquids. This may help thin mucus. It also may promote sinus drainage.    Heat may help soothe painful areas of the face. Use a towel soaked in hot water. Or,  the shower and direct the hot spray onto your face. Using a vaporizer along with a menthol rub at night may also help.     An expectorant containing guaifenesin may help thin the mucus and promote drainage from the sinuses.    Over-the-counter decongestants may be used unless a similar medicine was prescribed. Nasal sprays work the fastest. Use one that contains phenylephrine or oxymetazoline. First blow the nose gently. Then use the spray. Do not use these medicines more often than directed on the label or symptoms may get worse. You may also use tablets containing pseudoephedrine. Avoid products that combine ingredients, because side effects may be increased. Read labels. You can also ask the pharmacist for help. (NOTE: Persons with high blood pressure should not use decongestants. They can raise blood pressure.)    Over-the-counter antihistamines may help if allergies contributed to your sinusitis.      Use acetaminophen or ibuprofen to control pain, unless another pain medicine was prescribed. (If you have chronic liver or kidney disease or ever had a stomach ulcer, talk with your doctor before using these medicines. Aspirin should never be used in anyone under 18 years of age  who is ill with a fever. It may cause severe liver damage.)    Use nasal rinses or irrigation as instructed by your health care provider.    Don't smoke. This can worsen symptoms.  Follow-up care  Follow up with your healthcare provider or our staff if you are not improving within the next week.  When to seek medical advice  Call your healthcare provider if any of these occur:    Green or yellow discharge from the nose or into the throat    Facial pain or headache becoming more severe    Stiff neck    Unusual drowsiness or confusion    Swelling of the forehead or eyelids    Vision problems, including blurred or double vision    Fever of 100.4 F (38 C) or higher, or as directed by your healthcare provider    Seizure    Breathing problems    Symptoms not resolving within 10 days  Date Last Reviewed: 4/13/2015 2000-2017 [employer]. [address] [place] [address], [place] [address]. All rights reserved. This information is not intended as a substitute for professional medical care. Always follow your healthcare professional's instructions.

## 2018-02-14 NOTE — NURSING NOTE
Patient presents to clinic with chief complaint of sinus infection. Symptoms are sinus pressure and stuffy nose. It started 10 days ago.   Davon Michael LPN...... 1:32 PM 2/14/2018

## 2018-02-15 NOTE — PROGRESS NOTES
"Nursing Notes:   Davon Michael LPN  2/14/2018  2:18 PM  Signed  Patient presents to clinic with chief complaint of sinus infection. Symptoms are sinus pressure and stuffy nose. It started 10 days ago.   Davon Michael LPN...... 1:32 PM 2/14/2018      SUBJECTIVE:   Yandy Light is a 42 year old female who presents to clinic today for the following health issues:      Acute Illness   Acute illness concerns: Sinus illness  Onset: 10 days    Fever: no    Chills/Sweats: no    Headache (location?): YES, frontal, across the forehead    Sinus Pressure:YES- tender and facial pain    Conjunctivitis:  no    Ear Pain: no    Rhinorrhea: YES    Congestion: YES    Sore Throat: no      Cough: no    Wheeze: no    Decreased Appetite: no    Nausea: no    Vomiting: no    Diarrhea:  no    Dysuria/Freq.: no    Fatigue/Achiness: no    Sick/Strep Exposure: no     Therapies Tried and outcome: Sudafed, Cold meds, helpful some.       Problem list and histories reviewed & adjusted, as indicated.  Additional history: as documented    Current Outpatient Prescriptions   Medication Sig Dispense Refill     Multiple Vitamins-Minerals (MULTILEX-T&M) TABS        Not on File    Reviewed and updated as needed this visit by clinical staff  Tobacco  Allergies  Meds       Reviewed and updated as needed this visit by Provider       ROS:  A comprehensive 10 point ROS was obtained and documented for notable findings in the HPI.       OBJECTIVE:     /70 (BP Location: Right arm, Patient Position: Sitting, Cuff Size: Adult Regular)  Pulse 64  Temp 98.3  F (36.8  C) (Tympanic)  Resp 14  Ht 4' 11\" (1.499 m)  Wt 144 lb 3.2 oz (65.4 kg)  BMI 29.12 kg/m2  Body mass index is 29.12 kg/(m^2).  GENERAL: healthy, alert and no distress  EYES: Eyes grossly normal to inspection, PERRL and conjunctivae and sclerae normal  HENT: normal cephalic/atraumatic, ear canals and TM's normal, nose and mouth without ulcers or lesions, nasal mucosa edematous , " rhinorrhea clear, oropharynx clear, oral mucous membranes moist and sinuses: ethmoid, sphenoid tenderness on bilaterally  NECK: no adenopathy and no asymmetry, masses, or scars  RESP: lungs clear to auscultation - no rales, rhonchi or wheezes  CV: regular rate and rhythm, normal S1 S2, no S3 or S4, no murmur, click or rub, no peripheral edema and peripheral pulses strong  MS: no gross musculoskeletal defects noted, no edema  SKIN: no suspicious lesions or rashes  PSYCH: mentation appears normal, affect normal/bright      ASSESSMENT/PLAN:         ICD-10-CM    1. Nasal sinus congestion R09.81    2. Acute nasopharyngitis J00        See Patient Instructions, AVS printed off. Not likely bacterial yet. Give this another week. F/U then if not improving.     Savita Gutierrez NP  [facility]

## 2018-07-24 NOTE — PROGRESS NOTES
Patient Information     Patient Name  Yandy Light MRN  [id number] Sex  Female   1975      Letter by Flavio Dumont at      Author:  Flavio Dumont Service:  (none) Author Type:  (none)    Filed:   Date of Service:   Status:  (Other)       [facility]  [address]  [facility] [address]  [phone]         Yandy Light   [address] [address] [address]      May 24, 2017  Date of Breast Imagin2017 11:36 AM    Dear Ms. Light:    We are pleased to inform you that the result of your recent breast imaging examination is normal/benign (not cancer).    A report of your results was sent to your health care provider(s).    Your images will become part of your medical file here at [facility] and will be available for your continuing care. You are responsible for informing any new health care provider or breast imaging facility of the date and location of this examination.    Although mammography is the most accurate method for early detection, not all cancers are found through mammography. If you notice any new changes in your breast(s) please inform your health care provider without delay.    Thank you for choosing [facility] to participate in your healthcare needs.         [facility] Recommendations for Early Breast Cancer Detection   in Women without Symptoms  When to start having mammograms to screen for breast cancer, and how often to have them, is a personal decision. It should be based on your preferences, your values and your risk for developing breast cancer. [facility] recommends that you and your health care provider together determine when mammograms are right for you.    [facility] recommends the following guidelines for women who have an average risk for breast cancer, based on American Cancer Society guidelines:    Age 40 to 44: Mammograms are optional.      Age 45 to 54: Have a mammogram every year.           Age 55 and older: Have a mammogram every year, or transition to having one every 2 years. Continue to have mammograms as long as your health is good.    If you have a higher than average risk for breast cancer, your health care provider may recommend a different schedule.

## 2020-09-17 NOTE — DISCHARGE SUMMARY
Patient Information     Patient Name MRN Sex Yandy Modi [id number] Female 1975      Discharge Summaries by Spring Navas PT at 6/15/2017  4:11 PM     Author:  Spring Navas PT Service:  (none) Author Type:  PT- Physical Therapist     Filed:  6/15/2017  4:13 PM Date of Service:  6/15/2017  4:11 PM Status:  Signed     :  Spring Navas PT (PT- Physical Therapist)            [facility]  Outpatient PT - Discharge Summary    Date of discharge: 6/15/2017     Patient Name: Yandy Light   YOB: 1975   Referring MD/Provider: Johnson Arvizu NP  Medical and Treatment Diagnosis: Left AC impingement  PT Treatment Diagnosis: left shoulder impingement, cervical restriction, dural tension, soft tissue hypomobility  Insurance: Other:  Care  Start of Service: 17        Subjective from last visit on 17:     Patient reports 25% improvement with the pain and shoulder strength overall, but flexibility is much better.    Dates of Service: 3/14/17    Thru:  17  Number of visits: 4           Reason for Discharge:  Goals partially met  Plateau of patient progress  Patient discontinued Therapy for unknown reasons  Patient failed to schedule further appointments    Progress from Initial to Discharge (if applicable):  Decreased pain   Increased ROM   Increased strength    Improved overhead reaching   Improved ability to reach behind back   Improved lifting/carrying   Improved sleep     Comments: Please see last visit note from  for details of patient progress. This is an unplanned discharge.    Further Recommendations:    Patient will continue with established home exercise program    Patient is discharged from Physical Therapy    Thank you for your referral to [facility].  Please call with any questions, concerns or comments.  [phone]           Spring Navas, PT, DPT          
Murphy: when giving urine sample developed a return of cramping abdominal pain and nausea.

## 2021-03-01 ENCOUNTER — OFFICE VISIT (OUTPATIENT)
Dept: FAMILY MEDICINE | Facility: OTHER | Age: 46
End: 2021-03-01
Attending: PHYSICIAN ASSISTANT
Payer: COMMERCIAL

## 2021-03-01 VITALS
BODY MASS INDEX: 30.31 KG/M2 | HEIGHT: 60 IN | TEMPERATURE: 97.2 F | SYSTOLIC BLOOD PRESSURE: 100 MMHG | OXYGEN SATURATION: 99 % | DIASTOLIC BLOOD PRESSURE: 68 MMHG | WEIGHT: 154.38 LBS | RESPIRATION RATE: 16 BRPM | HEART RATE: 72 BPM

## 2021-03-01 DIAGNOSIS — H93.8X1 FULLNESS IN EAR, RIGHT: ICD-10-CM

## 2021-03-01 DIAGNOSIS — H93.8X1 EAR PRESSURE, RIGHT: Primary | ICD-10-CM

## 2021-03-01 PROCEDURE — G0463 HOSPITAL OUTPT CLINIC VISIT: HCPCS

## 2021-03-01 PROCEDURE — 99202 OFFICE O/P NEW SF 15 MIN: CPT | Performed by: PHYSICIAN ASSISTANT

## 2021-03-01 ASSESSMENT — MIFFLIN-ST. JEOR: SCORE: 1271.74

## 2021-03-01 ASSESSMENT — PAIN SCALES - GENERAL: PAINLEVEL: NO PAIN (0)

## 2021-03-01 NOTE — NURSING NOTE
"Patient presents to the clinic for right ear check due to it \"feeling different\" over the past couple of months.      Chief Complaint   Patient presents with     Ear Problem       Initial /68 (BP Location: Right arm, Patient Position: Sitting, Cuff Size: Adult Regular)   Pulse 72   Temp 97.2  F (36.2  C) (Temporal)   Resp 16   Ht 1.524 m (5')   Wt 70 kg (154 lb 6 oz)   LMP 02/15/2021 (Approximate)   SpO2 99%   BMI 30.15 kg/m   Estimated body mass index is 30.15 kg/m  as calculated from the following:    Height as of this encounter: 1.524 m (5').    Weight as of this encounter: 70 kg (154 lb 6 oz).  Medication Reconciliation: complete    Kelly Willis LPN'    "

## 2021-03-01 NOTE — PROGRESS NOTES
"SUBJECTIVE:   HPI  Yandy Light is a 45 year old female here for right ear issues.    Patient states she is felt a \"squeezing\" on and off at random times during the day for last 2 months.  Symptoms can occur as frequently as every 10 minutes and are seemingly random without changes in altitude or coughing, sneezing, or yawning.  She is able to partially reproduce the sensation when she closes her nose and initiates positive negative pressure.  She denies any pain or discharge of the ear, fevers, chills, night sweats, or recent sick contacts.  No pain with mastication or clicking in the jaw, no vision changes, no headaches.  She occasionally has slight vertigo upon standing up too quickly this predated her ear symptoms.    Allergies:  No Known Allergies  Review of Systems   As above otherwise ROS is unremarkable.     OBJECTIVE:     Vitals:    03/01/21 0820   BP: 100/68   BP Location: Right arm   Patient Position: Sitting   Cuff Size: Adult Regular   Pulse: 72   Resp: 16   Temp: 97.2  F (36.2  C)   TempSrc: Temporal   SpO2: 99%   Weight: 70 kg (154 lb 6 oz)   Height: 1.524 m (5')     Physical Exam  General Appearance: Pleasant, alert, appropriate appearance for age and circumstances, no acute distress  Head: Normocephalic, atraumatic  Eyes: PERRL, EOMI  Ears: TM's pearly gray and intact bilaterally and ossicles are clearly visible.  There is a small amount of scar tissue on the right tympanic membrane in the superior posterior quadrant.  Normal auditory canals and external ears.  No foreign bodies present.   OroPharynx: Dental hygiene adequate. Normal buccal mucosa. Normal pharynx. No exudates or petechia noted.  Neck: Supple, no masses or lymphadenopathy.  Neurologic Exam: CN 2-12 grossly intact.  Normal gait.    Psychiatric Exam: Alert and oriented, appropriate affect    ASSESSMENT/PLAN:     1. Ear pressure, right    2. Fullness in ear, right      1. Etiology of her right ear symptoms are unclear.  This may be an " early sign of Ménière's disease.  Also considered otitis externa, otitis media, foreign body, herpes zoster, vestibular neuronitis, acoustic neuroma, BPPV, TMJ issues, and upper respiratory infection.  2. After shared decision-making patient will monitor symptoms.  3. If symptoms persist or worsen and she feels she needs referral we could do audiology and MRI of the internal auditory canal.   4. Patient was asked and she agreed to Return if symptoms worsen or fail to improve.     Total time spent with this patient was 20 minutes which included chart review, visualization and interpretation of images, time spent with the patient, and documentation.    ANA Jay PA  March 1, 2021  8:37 AM    This document was prepared using voice generated software.  While every attempt was made for accuracy, grammatical errors may exist.

## 2021-03-26 ENCOUNTER — ALLIED HEALTH/NURSE VISIT (OUTPATIENT)
Dept: FAMILY MEDICINE | Facility: OTHER | Age: 46
End: 2021-03-26
Attending: FAMILY MEDICINE
Payer: COMMERCIAL

## 2021-03-26 DIAGNOSIS — Z20.828 CONTACT WITH OR EXPOSURE TO VIRAL DISEASE: Primary | ICD-10-CM

## 2021-03-26 PROCEDURE — U0005 INFEC AGEN DETEC AMPLI PROBE: HCPCS | Mod: ZL | Performed by: FAMILY MEDICINE

## 2021-03-26 PROCEDURE — C9803 HOPD COVID-19 SPEC COLLECT: HCPCS

## 2021-03-26 PROCEDURE — U0003 INFECTIOUS AGENT DETECTION BY NUCLEIC ACID (DNA OR RNA); SEVERE ACUTE RESPIRATORY SYNDROME CORONAVIRUS 2 (SARS-COV-2) (CORONAVIRUS DISEASE [COVID-19]), AMPLIFIED PROBE TECHNIQUE, MAKING USE OF HIGH THROUGHPUT TECHNOLOGIES AS DESCRIBED BY CMS-2020-01-R: HCPCS | Mod: ZL | Performed by: FAMILY MEDICINE

## 2021-03-27 LAB
SARS-COV-2 RNA RESP QL NAA+PROBE: NOT DETECTED
SPECIMEN SOURCE: NORMAL

## 2021-04-20 ENCOUNTER — VIRTUAL VISIT (OUTPATIENT)
Dept: INTERNAL MEDICINE | Facility: OTHER | Age: 46
End: 2021-04-20
Attending: NURSE PRACTITIONER
Payer: COMMERCIAL

## 2021-04-20 DIAGNOSIS — B37.31 YEAST INFECTION OF THE VAGINA: Primary | ICD-10-CM

## 2021-04-20 PROCEDURE — 99212 OFFICE O/P EST SF 10 MIN: CPT | Mod: 95 | Performed by: NURSE PRACTITIONER

## 2021-04-20 RX ORDER — FLUCONAZOLE 150 MG/1
150 TABLET ORAL ONCE
Qty: 1 TABLET | Refills: 0 | Status: SHIPPED | OUTPATIENT
Start: 2021-04-20 | End: 2021-04-20

## 2021-04-20 NOTE — NURSING NOTE
Chief Complaint   Patient presents with     Vaginal Problem   Patient presents for a video visit today for a possible yeast infection. Saturday patient noticed Symptoms are itching and  burning patient put on vagisil cream and it burned   Medication Reconciliation: completed   Sandy Martinez LPN  4/20/2021 7:53 AM

## 2021-04-20 NOTE — PROGRESS NOTES
Yandy is a 45 year old who is being evaluated via a billable video visit.      How would you like to obtain your AVS? MyChart  If the video visit is dropped, the invitation should be resent by: Send to e-mail at: [email or web address]  Will anyone else be joining your video visit? No      Video Start Time: 0804    Assessment & Plan     Yeast infection of the vagina  Discussed the importance of good hygiene.  She should change close and do pericare after riding horse to prevent any future issues.  She can eat yogurt.  Prescription sent in per her request.  - fluconazole (DIFLUCAN) 150 MG tablet  Dispense: 1 tablet; Refill: 0  - miconazole (MONISTAT 1 DAY OR NIGHT) 1200 & 2 MG & % kit  Dispense: 1 kit; Refill: 1    10 minutes spent on the date of the encounter doing chart review, history and exam, documentation and further activities per the note       BMI:   Estimated body mass index is 30.15 kg/m  as calculated from the following:    Height as of 3/1/21: 1.524 m (5').    Weight as of 3/1/21: 70 kg (154 lb 6 oz).       See Patient Instructions    Return if symptoms worsen or fail to improve.    Carli Martínez NP  [facility]    Subjective   Yandy is a 45 year old who presents for the following health issues     Complains of burning when she voids and vaginal itching.    HPI     Patient reports her symptoms started this past Saturday afternoon, she reports burning with urination, vaginal itching.  She denies any discharge.  She does report she just finished her period.  She denies any new sex partners.  She does endorse that she has recently been riding a lot of horse and is wondering if the friction has caused her symptoms.    Review of Systems   CONSTITUTIONAL: NEGATIVE for fever, chills, change in weight  ENT/MOUTH: NEGATIVE for ear, mouth and throat problems  RESP: NEGATIVE for significant cough or SOB  CV: NEGATIVE for chest pain, palpitations or peripheral edema  : normal menstrual  cycles, negative for vaginal discharge and positive for dysuria and vaginal itching      Objective       Vitals:  No vitals were obtained today due to virtual visit.    Physical Exam   GENERAL: Healthy, alert and no distress  EYES: Eyes grossly normal to inspection.  No discharge or erythema, or obvious scleral/conjunctival abnormalities.  RESP: No audible wheeze, cough, or visible cyanosis.  No visible retractions or increased work of breathing.    SKIN: Visible skin clear. No significant rash, abnormal pigmentation or lesions.  NEURO: Cranial nerves grossly intact.  Mentation and speech appropriate for age.  PSYCH: Mentation appears normal, affect normal/bright, judgement and insight intact, normal speech and appearance well-groomed.      Video-Visit Details    Type of service:  Video Visit    Video End Time:0809    Originating Location (pt. Location): Other car    Distant Location (provider location):  [facility]     Platform used for Video Visit: [place]

## 2021-04-25 ENCOUNTER — HEALTH MAINTENANCE LETTER (OUTPATIENT)
Age: 46
End: 2021-04-25

## 2021-05-24 DIAGNOSIS — H93.8X1 EAR PRESSURE, RIGHT: ICD-10-CM

## 2021-05-24 DIAGNOSIS — H91.93 DECREASED HEARING OF BOTH EARS: Primary | ICD-10-CM

## 2021-05-24 DIAGNOSIS — H93.8X1 FULLNESS IN EAR, RIGHT: Primary | ICD-10-CM

## 2021-05-24 NOTE — PROGRESS NOTES
I saw her on 3/1/2021 for squeezing in the right ear.  Symptoms are moderate nature or worsen can sometimes distract her while driving.  She continues to note fullness and squeezing in the right ear for the past 2 months and is requesting ENT referral.  She has tried Flonase.  No seasonal allergies.  No ear discharge, bleeding, or pain but has noted some occasional dizziness and vertigo which are nonpositional and random.      ENT referral placed.    ANA Zendejas  May 24, 2021  8:57 AM

## 2021-06-04 ENCOUNTER — ALLIED HEALTH/NURSE VISIT (OUTPATIENT)
Dept: FAMILY MEDICINE | Facility: OTHER | Age: 46
End: 2021-06-04
Attending: FAMILY MEDICINE
Payer: COMMERCIAL

## 2021-06-04 DIAGNOSIS — R05.9 COUGH: Primary | ICD-10-CM

## 2021-06-04 LAB
SARS-COV-2 RNA RESP QL NAA+PROBE: NORMAL
SPECIMEN SOURCE: NORMAL

## 2021-06-04 PROCEDURE — U0005 INFEC AGEN DETEC AMPLI PROBE: HCPCS | Mod: ZL | Performed by: FAMILY MEDICINE

## 2021-06-04 PROCEDURE — C9803 HOPD COVID-19 SPEC COLLECT: HCPCS

## 2021-06-04 PROCEDURE — U0003 INFECTIOUS AGENT DETECTION BY NUCLEIC ACID (DNA OR RNA); SEVERE ACUTE RESPIRATORY SYNDROME CORONAVIRUS 2 (SARS-COV-2) (CORONAVIRUS DISEASE [COVID-19]), AMPLIFIED PROBE TECHNIQUE, MAKING USE OF HIGH THROUGHPUT TECHNOLOGIES AS DESCRIBED BY CMS-2020-01-R: HCPCS | Mod: ZL | Performed by: FAMILY MEDICINE

## 2021-06-05 LAB
LABORATORY COMMENT REPORT: ABNORMAL
SARS-COV-2 RNA RESP QL NAA+PROBE: POSITIVE
SPECIMEN SOURCE: ABNORMAL

## 2021-06-28 ENCOUNTER — OFFICE VISIT (OUTPATIENT)
Dept: OTOLARYNGOLOGY | Facility: OTHER | Age: 46
End: 2021-06-28
Attending: AUDIOLOGIST
Payer: COMMERCIAL

## 2021-06-28 ENCOUNTER — OFFICE VISIT (OUTPATIENT)
Dept: AUDIOLOGY | Facility: OTHER | Age: 46
End: 2021-06-28
Attending: AUDIOLOGIST
Payer: COMMERCIAL

## 2021-06-28 VITALS
HEART RATE: 61 BPM | HEIGHT: 59 IN | DIASTOLIC BLOOD PRESSURE: 62 MMHG | SYSTOLIC BLOOD PRESSURE: 102 MMHG | BODY MASS INDEX: 29.23 KG/M2 | TEMPERATURE: 97.8 F | WEIGHT: 145 LBS | OXYGEN SATURATION: 99 %

## 2021-06-28 DIAGNOSIS — H69.91 DYSFUNCTION OF RIGHT EUSTACHIAN TUBE: Primary | ICD-10-CM

## 2021-06-28 DIAGNOSIS — H91.93 DECREASED HEARING OF BOTH EARS: ICD-10-CM

## 2021-06-28 DIAGNOSIS — H93.8X1 PRESSURE SENSATION IN EAR, RIGHT: Primary | ICD-10-CM

## 2021-06-28 PROCEDURE — 99213 OFFICE O/P EST LOW 20 MIN: CPT | Performed by: NURSE PRACTITIONER

## 2021-06-28 PROCEDURE — G0463 HOSPITAL OUTPT CLINIC VISIT: HCPCS | Mod: 25

## 2021-06-28 PROCEDURE — 92557 COMPREHENSIVE HEARING TEST: CPT | Performed by: AUDIOLOGIST

## 2021-06-28 PROCEDURE — 92550 TYMPANOMETRY & REFLEX THRESH: CPT | Performed by: AUDIOLOGIST

## 2021-06-28 ASSESSMENT — MIFFLIN-ST. JEOR: SCORE: 1213.35

## 2021-06-28 ASSESSMENT — PAIN SCALES - GENERAL: PAINLEVEL: NO PAIN (0)

## 2021-06-28 NOTE — PROGRESS NOTES
Audiology Evaluation Completed. Please refer SCANNED AUDIOGRAM and/or TYMPANOGRAM for BACKGROUND, RESULTS, RECOMMENDATIONS.      Lorna SAMANIEGO, [facility]  Audiologist #[id number]

## 2021-06-28 NOTE — PROGRESS NOTES
Otolaryngology Note         Chief Complaint:     Patient presents with:  Suspected Hearing Decrease: HEA follow up            History of Present Illness:     Yandy Light is a 45 year old female who presents today with a concern for right ear symptoms.  She reports symptoms started in January of this year.   She feels a squeezing/tightening sensation in her right ear that comes on randomly.  She reports it is consistent with the feeling of a muscle spasm.  At times it happens all day long, other times it only lasts about 10 minutes. No associated otalgia, otorrhea, flux hearing, aural fullness or vertigo.  She feels that her hearing is good.      She previously was not able to identify any inciting incidents, but recently she went to a horse training event, she reports that the  was talking on a microphone, she reports every time his voice hit a certain pitch she felt the sensation.   This was several weeks ago.  She reports she has not had symptoms in the past 3 weeks.      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  Decibels range 40-90's.  She does not currently wear hearing protection with use.  We discussed this at length, there is some barriers to use a this time due to covid (aerolizing secretions) so she is not currently using frequently.   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      History of vertigo x 2 bouts in the distant past.   She is careful to avoid inciting incident.  Last episode of vertigo was about 5 years ago and lasted about 1/2 a day.  No current vertigo symptoms.      She denies seasonal allergy symptoms.    Very random minimal tinnitus  No sinus pain or pressure.    Caffeine - minimal  Alcohol - none  Tobacco - none  Salt - high average - has increased recently due to intermittent fasting  Stress - moderate - no recent changes except covid, has 4 teenage children  No history of OM  or ear surgery    Audiogram completed today:  Tympanograms are Type A for both ears suggesting normal eardrum mobility.  Acoustic Reflex Thresholds at 1000 Hz are present for both ears.  Thresholds are normal range both ears.  Speech reception thresholds are in good agreement with pure tone average.  Word discrimination scores are excellent at supra-thresholds level.         Medications:     Current Outpatient Rx   Medication Sig Dispense Refill     Multiple Vitamins-Minerals (MULTILEX-T&M) TABS        miconazole (MONISTAT 1 DAY OR NIGHT) 1200 & 2 MG & % kit Use per package directions (Patient not taking: Reported on 2021) 1 kit 1            Allergies:     Allergies: Patient has no known allergies.          Past Medical History:     Past Medical History:   Diagnosis Date     Allergic rhinitis     No Comments Provided     Closed displaced fracture of lesser toe of left foot     No Comments Provided     Closed displaced fracture of triquetral bone     bilateral wrist fractures at age 17     Encounter for sterilization     2007,[facility]     Hemorrhoids     2007     Other specified disorders of kidney and ureter     ,as child, had recurrent UTIs as a child     Personal history of other medical treatment (CODE)     Para 2-1-0-4  - twins at 34 weeks and 36 weeks     Severe pre-eclampsia     HELLP syndrome.  Delivered at 33 weeks.            Past Surgical History:     Past Surgical History:   Procedure Laterality Date      SECTION      , Section      SECTION      2007, at [facility], delivered at 36 weeks.  Planned due to one of the twins with coarctation of aorta & spina bifida     LAPAROSCOPIC TUBAL LIGATION      No Comments Provided     OTHER SURGICAL HISTORY      ,[id number],WRIST FRACTURE TX     OTHER SURGICAL HISTORY      ,94673.0,MO ANES KIDNEY PROX URETER SURG     OTHER SURGICAL HISTORY      428863,ANESTHESIA ALERT,Notes prior  "complications from anesthesia: 'takes a long time to wake up     OTHER SURGICAL HISTORY      2009,ELQ908,LIPECTOMY,with repair of diastasis recti       ENT family history reviewed         Social History:     Social History     Tobacco Use     Smoking status: Never Smoker     Smokeless tobacco: Never Used   Substance Use Topics     Alcohol use: Yes     Comment: 2 drinks per month     Drug use: Never            Review of Systems:     ROS: See HPI         Physical Exam:     /62 (BP Location: Right arm, Patient Position: Sitting, Cuff Size: Adult Regular)   Pulse 61   Temp 97.8  F (36.6  C) (Tympanic)   Ht 1.499 m (4' 11\")   Wt 65.8 kg (145 lb)   SpO2 99%   BMI 29.29 kg/m      General - The patient is well nourished and well developed, and appears to have good nutritional status.  Alert and oriented to person and place, answers questions and cooperates with examination appropriately.   Head and Face - Normocephalic and atraumatic, with no gross asymmetry noted.  The facial nerve is intact, with strong symmetric movements.  Voice and Breathing - The patient was breathing comfortably without the use of accessory muscles. There was no wheezing, stridor, or stertor.  The patients voice was clear and strong, and had appropriate pitch and quality.  Ears -The external auditory canals are patent, the tympanic membranes are intact without effusion, retraction or mass.  Bony landmarks are intact.  Eyes - Extraocular movements intact, sclera were not icteric or injected, conjunctiva were pink and moist.  Mouth - Examination of the oral cavity showed pink, healthy oral mucosa. No lesions or ulcerations noted.  The tongue was mobile and midline, and the dentition were in good condition.    Throat - The walls of the oropharynx were smooth, pink, moist, symmetric, and had no lesions or ulcerations.  The tonsillar pillars and soft palate were symmetric.  The uvula was midline on elevation.    Neck - Normal midline excursion " of the laryngotracheal complex during swallowing.  Full range of motion on passive movement.  Palpation of the occipital, submental, submandibular, internal jugular chain, and supraclavicular nodes did not demonstrate any abnormal lymph nodes or masses.  Palpation of the thyroid was soft and smooth, with no nodules or goiter appreciated.  The trachea was mobile and midline.  Nose - External contour is symmetric, no gross deflection or scars.  Nasal mucosa is pink and moist with no abnormal mucus.  The septum was intact and the turbinates are enlarged.  No polyps, masses, or purulence noted on examination.           Assessment and Plan:       ICD-10-CM    1. Pressure sensation in ear, right  H93.8X1     Intermittent sensation of pressure in right ear only, normal audiogram, working diagnosis of myclonus     No symptoms for about 3 weeks now.  We discussed lows CATS diet, I gave her a website to read up on tensor tympani.  Advised if she has recurrence of symptoms follow up for exam.      Reassured today that ears otherwise look healthy, Audiogram is grossly normal    Ne ESPAÑA  [facility] ENT

## 2021-06-28 NOTE — LETTER
6/28/2021         RE: Yandy Light  [address] [address] [facility] [place] [address]        Dear Colleague,    Thank you for referring your patient, Yandy Light, to the [facility]. Please see a copy of my visit note below.    Otolaryngology Note         Chief Complaint:     Patient presents with:  Suspected Hearing Decrease: HEA follow up            History of Present Illness:     Yandy Light is a 45 year old female who presents today with a concern for right ear symptoms.  She reports symptoms started in January of this year.   She feels a squeezing/tightening sensation in her right ear that comes on randomly.  She reports it is consistent with the feeling of a muscle spasm.  At times it happens all day long, other times it only lasts about 10 minutes. No associated otalgia, otorrhea, flux hearing, aural fullness or vertigo.  She feels that her hearing is good.      She previously was not able to identify any inciting incidents, but recently she went to a horse training event, she reports that the  was talking on a microphone, she reports every time his voice hit a certain pitch she felt the sensation.   This was several weeks ago.  She reports she has not had symptoms in the past 3 weeks.      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  Decibels range 40-90's.  She does not currently wear hearing protection with use.  We discussed this at length, there is some barriers to use a this time due to covid (aerolizing secretions) so she is not currently using frequently.   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      History of vertigo x 2 bouts in the distant past.   She is careful to avoid inciting incident.  Last episode of vertigo was about 5 years ago and lasted about 1/2 a day.  No current vertigo symptoms.      She denies seasonal allergy symptoms.    Very random minimal  tinnitus  No sinus pain or pressure.    Caffeine - minimal  Alcohol - none  Tobacco - none  Salt - high average - has increased recently due to intermittent fasting  Stress - moderate - no recent changes except covid, has 4 teenage children  No history of OM or ear surgery    Audiogram completed today:  Tympanograms are Type A for both ears suggesting normal eardrum mobility.  Acoustic Reflex Thresholds at 1000 Hz are present for both ears.  Thresholds are normal range both ears.  Speech reception thresholds are in good agreement with pure tone average.  Word discrimination scores are excellent at supra-thresholds level.         Medications:     Current Outpatient Rx   Medication Sig Dispense Refill     Multiple Vitamins-Minerals (MULTILEX-T&M) TABS        miconazole (MONISTAT 1 DAY OR NIGHT) 1200 & 2 MG & % kit Use per package directions (Patient not taking: Reported on 2021) 1 kit 1            Allergies:     Allergies: Patient has no known allergies.          Past Medical History:     Past Medical History:   Diagnosis Date     Allergic rhinitis     No Comments Provided     Closed displaced fracture of lesser toe of left foot     No Comments Provided     Closed displaced fracture of triquetral bone     bilateral wrist fractures at age 17     Encounter for sterilization     2007,[facility]     Hemorrhoids     2007     Other specified disorders of kidney and ureter     1980,as child, had recurrent UTIs as a child     Personal history of other medical treatment (CODE)     Para 2-1-0-4  - twins at 34 weeks and 36 weeks     Severe pre-eclampsia     HELLP syndrome.  Delivered at 33 weeks.            Past Surgical History:     Past Surgical History:   Procedure Laterality Date      SECTION      04, Section      SECTION      2007, at [facility], delivered at 36 weeks.  Planned due to one of the twins with coarctation of aorta & spina bifida      "LAPAROSCOPIC TUBAL LIGATION      No Comments Provided     OTHER SURGICAL HISTORY      1994,[id number],WRIST FRACTURE [place]     OTHER SURGICAL HISTORY      1980,79958.0,[place] ANES KIDNEY PROX URETER SURG     OTHER SURGICAL HISTORY      [id number],ANESTHESIA ALERT,Notes prior complications from anesthesia: 'takes a long time to wake up     OTHER SURGICAL HISTORY      2009,GXX213,LIPECTOMY,with repair of diastasis recti       ENT family history reviewed         Social History:     Social History     Tobacco Use     Smoking status: Never Smoker     Smokeless tobacco: Never Used   Substance Use Topics     Alcohol use: Yes     Comment: 2 drinks per month     Drug use: Never            Review of Systems:     ROS: See HPI         Physical Exam:     /62 (BP Location: Right arm, Patient Position: Sitting, Cuff Size: Adult Regular)   Pulse 61   Temp 97.8  F (36.6  C) (Tympanic)   Ht 1.499 m (4' 11\")   Wt 65.8 kg (145 lb)   SpO2 99%   BMI 29.29 kg/m      General - The patient is well nourished and well developed, and appears to have good nutritional status.  Alert and oriented to person and place, answers questions and cooperates with examination appropriately.   Head and Face - Normocephalic and atraumatic, with no gross asymmetry noted.  The facial nerve is intact, with strong symmetric movements.  Voice and Breathing - The patient was breathing comfortably without the use of accessory muscles. There was no wheezing, stridor, or stertor.  The patients voice was clear and strong, and had appropriate pitch and quality.  Ears -The external auditory canals are patent, the tympanic membranes are intact without effusion, retraction or mass.  Bony landmarks are intact.  Eyes - Extraocular movements intact, sclera were not icteric or injected, conjunctiva were pink and moist.  Mouth - Examination of the oral cavity showed pink, healthy oral mucosa. No lesions or ulcerations noted.  The tongue was mobile and midline, and the dentition " were in good condition.    Throat - The walls of the oropharynx were smooth, pink, moist, symmetric, and had no lesions or ulcerations.  The tonsillar pillars and soft palate were symmetric.  The uvula was midline on elevation.    Neck - Normal midline excursion of the laryngotracheal complex during swallowing.  Full range of motion on passive movement.  Palpation of the occipital, submental, submandibular, internal jugular chain, and supraclavicular nodes did not demonstrate any abnormal lymph nodes or masses.  Palpation of the thyroid was soft and smooth, with no nodules or goiter appreciated.  The trachea was mobile and midline.  Nose - External contour is symmetric, no gross deflection or scars.  Nasal mucosa is pink and moist with no abnormal mucus.  The septum was intact and the turbinates are enlarged.  No polyps, masses, or purulence noted on examination.           Assessment and Plan:       ICD-10-CM    1. Pressure sensation in ear, right  H93.8X1     Intermittent sensation of pressure in right ear only, normal audiogram, working diagnosis of myclonus     No symptoms for about 3 weeks now.  We discussed lows CATS diet, I gave her a website to read up on tensor tympani.  Advised if she has recurrence of symptoms follow up for exam.      Reassured today that ears otherwise look healthy, Audiogram is grossly normal    Ne ESPAÑA  [facility]          Again, thank you for allowing me to participate in the care of your patient.        Sincerely,        Ne Swan NP

## 2021-06-28 NOTE — NURSING NOTE
"Chief Complaint   Patient presents with     Suspected Hearing Decrease     HEA follow up        Initial /62 (BP Location: Right arm, Patient Position: Sitting, Cuff Size: Adult Regular)   Pulse 61   Temp 97.8  F (36.6  C) (Tympanic)   Ht 1.499 m (4' 11\")   Wt 65.8 kg (145 lb)   SpO2 99%   BMI 29.29 kg/m   Estimated body mass index is 29.29 kg/m  as calculated from the following:    Height as of this encounter: 1.499 m (4' 11\").    Weight as of this encounter: 65.8 kg (145 lb).  Medication Reconciliation: complete  Abby Elizabeth LPN    "

## 2021-06-28 NOTE — PATIENT INSTRUCTIONS
Thank you for allowing Ne Sosa and our ENT team to participate in your care.  If your medications are too expensive, please give the nurse a call.  We can possibly change this medication.  If you have a scheduling or an appointment question please contact our Health Unit Coordinator at their direct line [phone].   ALL nursing questions or concerns can be directed to your ENT nurse at: [phone] - Yeg      [email or web address] - here is a website you can read about tensor tympani    Call if you have increased symptoms or new concerns

## 2021-08-16 ENCOUNTER — OFFICE VISIT (OUTPATIENT)
Dept: FAMILY MEDICINE | Facility: OTHER | Age: 46
End: 2021-08-16
Attending: FAMILY MEDICINE
Payer: COMMERCIAL

## 2021-08-16 VITALS
HEIGHT: 59 IN | BODY MASS INDEX: 30.32 KG/M2 | WEIGHT: 150.4 LBS | RESPIRATION RATE: 18 BRPM | HEART RATE: 74 BPM | SYSTOLIC BLOOD PRESSURE: 110 MMHG | DIASTOLIC BLOOD PRESSURE: 60 MMHG | OXYGEN SATURATION: 99 %

## 2021-08-16 DIAGNOSIS — Z11.59 ENCOUNTER FOR HEPATITIS C SCREENING TEST FOR LOW RISK PATIENT: ICD-10-CM

## 2021-08-16 DIAGNOSIS — D22.9 MULTIPLE NEVI: ICD-10-CM

## 2021-08-16 DIAGNOSIS — Z11.4 ENCOUNTER FOR SCREENING FOR HIV: ICD-10-CM

## 2021-08-16 DIAGNOSIS — Z13.220 LIPID SCREENING: ICD-10-CM

## 2021-08-16 DIAGNOSIS — Z00.00 ANNUAL PHYSICAL EXAM: Primary | ICD-10-CM

## 2021-08-16 DIAGNOSIS — Z12.4 SCREENING FOR MALIGNANT NEOPLASM OF CERVIX: ICD-10-CM

## 2021-08-16 PROCEDURE — G0123 SCREEN CERV/VAG THIN LAYER: HCPCS | Performed by: FAMILY MEDICINE

## 2021-08-16 PROCEDURE — 87624 HPV HI-RISK TYP POOLED RSLT: CPT | Mod: ZL | Performed by: FAMILY MEDICINE

## 2021-08-16 PROCEDURE — G0463 HOSPITAL OUTPT CLINIC VISIT: HCPCS

## 2021-08-16 PROCEDURE — 99396 PREV VISIT EST AGE 40-64: CPT | Performed by: FAMILY MEDICINE

## 2021-08-16 ASSESSMENT — PAIN SCALES - GENERAL: PAINLEVEL: NO PAIN (0)

## 2021-08-16 ASSESSMENT — MIFFLIN-ST. JEOR: SCORE: 1237.84

## 2021-08-16 NOTE — PROGRESS NOTES
SUBJECTIVE:   CC: Yandy Light is an 45 year old woman who presents for preventive health visit.     Patient has been advised of split billing requirements and indicates understanding: Yes  HPI    She has some skin spots for which she would like to be seen by dermatology for evaluation and treatment.    Today's PHQ-2 Score:   PHQ-2 ( 1999 Pfizer) 8/16/2021   Q1: Little interest or pleasure in doing things 0   Q2: Feeling down, depressed or hopeless 0   PHQ-2 Score 0     Abuse: Current or Past (Physical, Sexual or Emotional) - No  Do you feel safe in your environment? Yes    Have you ever done Advance Care Planning? (For example, a Health Directive, POLST, or a discussion with a medical provider or your loved ones about your wishes): Yes, patient states has an Advance Care Planning document and will bring a copy to the clinic.    Social History     Tobacco Use     Smoking status: Never Smoker     Smokeless tobacco: Never Used   Substance Use Topics     Alcohol use: Yes     Comment: 2 drinks per month     If you drink alcohol do you typically have >3 drinks per day or >7 drinks per week? Not applicable    Reviewed orders with patient.  Reviewed health maintenance and updated orders accordingly - Yes  Lab work is in process    Breast Cancer Screening:  Any new diagnosis of family breast, ovarian, or bowel cancer? No    Mammogram Screening - Offered annual screening and updated Health Maintenance for mutual plan based on risk factor consideration    Pertinent mammograms are reviewed under the imaging tab.    History of abnormal Pap smear: NO - age 30-65 PAP every 5 years with negative HPV co-testing recommended     Reviewed and updated as needed this visit by clinical staff  Tobacco  Allergies  Meds      Soc Hx      Reviewed and updated as needed this visit by Provider                Past Medical History:   Diagnosis Date     Allergic rhinitis     No Comments Provided     Closed displaced fracture of lesser toe  of left foot     No Comments Provided     Closed displaced fracture of triquetral bone     bilateral wrist fractures at age 17     Encounter for sterilization     2007,[facility]     Hemorrhoids     2007     Other specified disorders of kidney and ureter     ,as child, had recurrent UTIs as a child     Personal history of other medical treatment (CODE)     Para 2-1-0-4  - twins at 34 weeks and 36 weeks     Severe pre-eclampsia     HELLP syndrome.  Delivered at 33 weeks.      Past Surgical History:   Procedure Laterality Date      SECTION      , Section      SECTION      2007, at [facility], delivered at 36 weeks.  Planned due to one of the twins with coarctation of aorta & spina bifida     LAPAROSCOPIC TUBAL LIGATION      No Comments Provided     OTHER SURGICAL HISTORY      ,[id number],WRIST FRACTURE [place]     OTHER SURGICAL HISTORY      ,18119.0,[place] ANES KIDNEY PROX URETER SURG     OTHER SURGICAL HISTORY      237037,ANESTHESIA ALERT,Notes prior complications from anesthesia: 'takes a long time to wake up     OTHER SURGICAL HISTORY      ,[id number],LIPECTOMY,with repair of diastasis recti     OB History    Para Term  AB Living   2 2 0 2 0 4   SAB TAB Ectopic Multiple Live Births   0 0 0 2 4      # Outcome Date GA Lbr Mook/2nd Weight Sex Delivery Anes PTL Lv   2A  07 36w0d   F CS-LTranv   KHURRAM      Birth Comments: twin gestation, one baby with spinabifida & coarctation of aorta   2B  07 36w0d   F CS-LTranv   KHURRAM   1A  04 33w0d  2.223 kg (4 lb 14.4 oz) M CS-Unspec   KHURRAM      Birth Comments: PTL, HEELP Syndrome,       Apgar1: 8  Apgar5: 9   1B  04 33w0d  1.878 kg (4 lb 2.2 oz) M CS-Unspec   KHURRAM      Apgar1: 7  Apgar5: 8     Review of Systems  CONSTITUTIONAL: NEGATIVE for fever, chills, change in weight  INTEGUMENTARU/SKIN: NEGATIVE for worrisome rashes, moles or lesions  EYES:  "NEGATIVE for vision changes or irritation  ENT: NEGATIVE for ear, mouth and throat problems  RESP: NEGATIVE for significant cough or SOB  BREAST: NEGATIVE for masses, tenderness or discharge  CV: NEGATIVE for chest pain, palpitations or peripheral edema  GI: NEGATIVE for nausea, abdominal pain, heartburn, or change in bowel habits  : NEGATIVE for unusual urinary or vaginal symptoms. Periods are regular.  MUSCULOSKELETAL: NEGATIVE for significant arthralgias or myalgia  NEURO: NEGATIVE for weakness, dizziness or paresthesias  PSYCHIATRIC: NEGATIVE for changes in mood or affect     OBJECTIVE:   /60 (BP Location: Right arm, Patient Position: Sitting, Cuff Size: Adult Regular)   Pulse 74   Resp 18   Ht 1.499 m (4' 11\")   Wt 68.2 kg (150 lb 6.4 oz)   SpO2 99%   BMI 30.38 kg/m    Physical Exam  GENERAL: healthy, alert and no distress  EYES: Eyes grossly normal to inspection, PERRL and conjunctivae and sclerae normal  HENT: ear canals and TM's normal, nose and mouth without ulcers or lesions  NECK: no adenopathy, no asymmetry, masses, or scars and thyroid normal to palpation  RESP: lungs clear to auscultation - no rales, rhonchi or wheezes  CV: regular rate and rhythm, normal S1 S2, no S3 or S4, no murmur, click or rub, no peripheral edema and peripheral pulses strong  ABDOMEN: soft, nontender, no hepatosplenomegaly, no masses and bowel sounds normal   (female): normal female external genitalia, normal urethral meatus, vaginal mucosa pink, moist, well rugated, and normal cervix/adnexa/uterus without masses or discharge  MS: no gross musculoskeletal defects noted, no edema  SKIN: no suspicious lesions or rashes  NEURO: Normal strength and tone, mentation intact and speech normal  PSYCH: mentation appears normal, affect normal/bright    Diagnostic Test Results:  Labs reviewed in Epic    ASSESSMENT/PLAN:   1. Annual physical exam  No daily ASA indicated.  BP at goal < 130/80.  Check lipid panel for ASCVD risk " "assessment.  Hepatitis C and HIV screening ordered.  Cervical cancer screening completed today.  No indication for early breast or colon cancer screening.  Encouraged her to obtain COVID-19 vaccine.  Immunizations otherwise up-to-date.  Counseled on healthy diet and exercise.  No depression.  - CBC and Differential; Future  - Comprehensive Metabolic Panel; Future  - Lipid Panel; Future  - Pap Screen Thin Prep with HPV - recommended age 30 - 65 years  - HPV High Risk Types DNA Cervical    2. Lipid screening  - Lipid Panel; Future    3. Encounter for hepatitis C screening test for low risk patient  - Hepatitis C Screen Reflex to HCV RNA Quant and Genotype; Future    4. Encounter for screening for HIV  - HIV Antigen Antibody Combo; Future    5. Screening for malignant neoplasm of cervix  - HPV High Risk Types DNA Cervical    Patient has been advised of split billing requirements and indicates understanding: Yes  COUNSELING:  Reviewed preventive health counseling, as reflected in patient instructions       Regular exercise       Healthy diet/nutrition       Vision screening       Hearing screening       Immunizations       Osteoporosis prevention/bone health       Colon cancer screening       Consider Hep C screening for all patients one time for ages 18-79 years       HIV screeninx in teen years, 1x in adult years, and at intervals if high risk    Estimated body mass index is 30.38 kg/m  as calculated from the following:    Height as of this encounter: 1.499 m (4' 11\").    Weight as of this encounter: 68.2 kg (150 lb 6.4 oz).    Weight management plan: Discussed healthy diet and exercise guidelines    She reports that she has never smoked. She has never used smokeless tobacco.    Counseling Resources:  ATP IV Guidelines  Pooled Cohorts Equation Calculator  Breast Cancer Risk Calculator  BRCA-Related Cancer Risk Assessment: FHS-7 Tool  FRAX Risk Assessment  ICSI Preventive Guidelines  Dietary Guidelines for " Americans, 2010  USDA's MyPlate  ASA Prophylaxis  Lung CA Screening    DO [facility]

## 2021-08-16 NOTE — NURSING NOTE
"Chief Complaint   Patient presents with     Physical   Patient presents to clinic for yearly physical. Has a spot on right cheek she would like checked, wondering about dermatology referral. Would like labs done also, has been following Keto diet.    Initial /60 (BP Location: Right arm, Patient Position: Sitting, Cuff Size: Adult Regular)   Pulse 74   Resp 18   Ht 1.499 m (4' 11\")   Wt 68.2 kg (150 lb 6.4 oz)   SpO2 99%   BMI 30.38 kg/m   Estimated body mass index is 30.38 kg/m  as calculated from the following:    Height as of this encounter: 1.499 m (4' 11\").    Weight as of this encounter: 68.2 kg (150 lb 6.4 oz).  Medication Reconciliation: complete    FOOD SECURITY SCREENING QUESTIONS  Hunger Vital Signs:  Within the past 12 months we worried whether our food would run out before we got money to buy more. Never  Within the past 12 months the food we bought just didn't last and we didn't have money to get more. Never      CHRISTOPHE MELLO, LPN  "

## 2021-08-18 LAB
BKR LAB AP GYN ADEQUACY: NORMAL
BKR LAB AP GYN INTERPRETATION: NORMAL
BKR LAB AP HPV REFLEX: NORMAL
BKR LAB AP PREVIOUS ABNORMAL: NORMAL
PATH REPORT.RELEVANT HX SPEC: NORMAL

## 2021-08-20 LAB
HUMAN PAPILLOMA VIRUS 16 DNA: NEGATIVE
HUMAN PAPILLOMA VIRUS 18 DNA: NEGATIVE
HUMAN PAPILLOMA VIRUS FINAL DIAGNOSIS: NORMAL
HUMAN PAPILLOMA VIRUS OTHER HR: NEGATIVE

## 2021-08-24 ENCOUNTER — TELEPHONE (OUTPATIENT)
Dept: FAMILY MEDICINE | Facility: OTHER | Age: 46
End: 2021-08-24

## 2021-08-24 NOTE — TELEPHONE ENCOUNTER
Patient was wondering about the dermatology referral.    She would like to go to [facility].    Please let patient know when referral is placed.  Thank you   Jing Mancini on 8/24/2021 at 8:28 AM

## 2021-08-24 NOTE — TELEPHONE ENCOUNTER
Left message that there is not a referral currently placed or mentioned in her last visit note.  Routing to Yaquelin Colbert DO for review 8/25/21.  Please note the patient preference if placing a referral and call patient when done.  Jillian Peraza CMA ([facility])

## 2021-08-26 ENCOUNTER — LAB (OUTPATIENT)
Dept: LAB | Facility: OTHER | Age: 46
End: 2021-08-26
Attending: FAMILY MEDICINE
Payer: COMMERCIAL

## 2021-08-26 DIAGNOSIS — Z11.4 ENCOUNTER FOR SCREENING FOR HIV: ICD-10-CM

## 2021-08-26 DIAGNOSIS — Z00.00 ANNUAL PHYSICAL EXAM: ICD-10-CM

## 2021-08-26 DIAGNOSIS — Z11.59 ENCOUNTER FOR HEPATITIS C SCREENING TEST FOR LOW RISK PATIENT: ICD-10-CM

## 2021-08-26 DIAGNOSIS — Z13.220 LIPID SCREENING: ICD-10-CM

## 2021-08-26 LAB
ALBUMIN SERPL-MCNC: 4.1 G/DL (ref 3.5–5.7)
ALP SERPL-CCNC: 47 U/L (ref 34–104)
ALT SERPL W P-5'-P-CCNC: 10 U/L (ref 7–52)
ANION GAP SERPL CALCULATED.3IONS-SCNC: 5 MMOL/L (ref 3–14)
AST SERPL W P-5'-P-CCNC: 14 U/L (ref 13–39)
BASOPHILS # BLD AUTO: 0 10E3/UL (ref 0–0.2)
BASOPHILS NFR BLD AUTO: 1 %
BILIRUB SERPL-MCNC: 0.4 MG/DL (ref 0.3–1)
BUN SERPL-MCNC: 15 MG/DL (ref 7–25)
CALCIUM SERPL-MCNC: 9.1 MG/DL (ref 8.6–10.3)
CHLORIDE BLD-SCNC: 107 MMOL/L (ref 98–107)
CHOLEST SERPL-MCNC: 188 MG/DL
CO2 SERPL-SCNC: 28 MMOL/L (ref 21–31)
CREAT SERPL-MCNC: 0.67 MG/DL (ref 0.6–1.2)
EOSINOPHIL # BLD AUTO: 0.1 10E3/UL (ref 0–0.7)
EOSINOPHIL NFR BLD AUTO: 2 %
ERYTHROCYTE [DISTWIDTH] IN BLOOD BY AUTOMATED COUNT: 12.3 % (ref 10–15)
FASTING STATUS PATIENT QL REPORTED: YES
GFR SERPL CREATININE-BSD FRML MDRD: >90 ML/MIN/1.73M2
GLUCOSE BLD-MCNC: 87 MG/DL (ref 70–105)
HCT VFR BLD AUTO: 38.9 % (ref 35–47)
HDLC SERPL-MCNC: 58 MG/DL (ref 23–92)
HGB BLD-MCNC: 13 G/DL (ref 11.7–15.7)
IMM GRANULOCYTES # BLD: 0 10E3/UL
IMM GRANULOCYTES NFR BLD: 0 %
LDLC SERPL CALC-MCNC: 121 MG/DL
LYMPHOCYTES # BLD AUTO: 1 10E3/UL (ref 0.8–5.3)
LYMPHOCYTES NFR BLD AUTO: 25 %
MCH RBC QN AUTO: 31.7 PG (ref 26.5–33)
MCHC RBC AUTO-ENTMCNC: 33.4 G/DL (ref 31.5–36.5)
MCV RBC AUTO: 95 FL (ref 78–100)
MONOCYTES # BLD AUTO: 0.4 10E3/UL (ref 0–1.3)
MONOCYTES NFR BLD AUTO: 9 %
NEUTROPHILS # BLD AUTO: 2.6 10E3/UL (ref 1.6–8.3)
NEUTROPHILS NFR BLD AUTO: 63 %
NONHDLC SERPL-MCNC: 130 MG/DL
NRBC # BLD AUTO: 0 10E3/UL
NRBC BLD AUTO-RTO: 0 /100
PLATELET # BLD AUTO: 192 10E3/UL (ref 150–450)
POTASSIUM BLD-SCNC: 4.5 MMOL/L (ref 3.5–5.1)
PROT SERPL-MCNC: 6.6 G/DL (ref 6.4–8.9)
RBC # BLD AUTO: 4.1 10E6/UL (ref 3.8–5.2)
SODIUM SERPL-SCNC: 140 MMOL/L (ref 134–144)
TRIGL SERPL-MCNC: 47 MG/DL
WBC # BLD AUTO: 4 10E3/UL (ref 4–11)

## 2021-08-26 PROCEDURE — 86803 HEPATITIS C AB TEST: CPT | Mod: ZL

## 2021-08-26 PROCEDURE — 87389 HIV-1 AG W/HIV-1&-2 AB AG IA: CPT | Mod: ZL

## 2021-08-26 PROCEDURE — 85025 COMPLETE CBC W/AUTO DIFF WBC: CPT | Mod: ZL

## 2021-08-26 PROCEDURE — 36415 COLL VENOUS BLD VENIPUNCTURE: CPT | Mod: ZL

## 2021-08-26 PROCEDURE — 80061 LIPID PANEL: CPT | Mod: ZL

## 2021-08-26 PROCEDURE — 80053 COMPREHEN METABOLIC PANEL: CPT | Mod: ZL

## 2021-08-26 NOTE — TELEPHONE ENCOUNTER
Left message that referral was placed and Dermatology will call her to schedule.  Norma J. Gosselin, LPN .......  8/26/2021  2:25 PM

## 2021-08-27 LAB
HCV AB SERPL QL IA: NONREACTIVE
HIV 1+2 AB+HIV1 P24 AG SERPL QL IA: NONREACTIVE

## 2021-09-01 ENCOUNTER — DOCUMENTATION ONLY (OUTPATIENT)
Dept: OTHER | Facility: CLINIC | Age: 46
End: 2021-09-01

## 2021-09-10 ENCOUNTER — IMMUNIZATION (OUTPATIENT)
Dept: FAMILY MEDICINE | Facility: OTHER | Age: 46
End: 2021-09-10
Attending: FAMILY MEDICINE
Payer: COMMERCIAL

## 2021-09-10 PROCEDURE — 91300 PR COVID VAC PFIZER DIL RECON 30 MCG/0.3 ML IM: CPT

## 2021-09-13 ENCOUNTER — TRANSFERRED RECORDS (OUTPATIENT)
Dept: HEALTH INFORMATION MANAGEMENT | Facility: OTHER | Age: 46
End: 2021-09-13

## 2021-10-04 ENCOUNTER — IMMUNIZATION (OUTPATIENT)
Dept: FAMILY MEDICINE | Facility: OTHER | Age: 46
End: 2021-10-04
Attending: FAMILY MEDICINE
Payer: COMMERCIAL

## 2021-10-04 PROCEDURE — 91300 PR COVID VAC PFIZER DIL RECON 30 MCG/0.3 ML IM: CPT

## 2022-08-31 ENCOUNTER — MYC MEDICAL ADVICE (OUTPATIENT)
Dept: FAMILY MEDICINE | Facility: OTHER | Age: 47
End: 2022-08-31

## 2022-11-04 ENCOUNTER — OFFICE VISIT (OUTPATIENT)
Dept: FAMILY MEDICINE | Facility: OTHER | Age: 47
End: 2022-11-04
Attending: PHYSICIAN ASSISTANT
Payer: COMMERCIAL

## 2022-11-04 VITALS
BODY MASS INDEX: 31.12 KG/M2 | WEIGHT: 154.4 LBS | DIASTOLIC BLOOD PRESSURE: 70 MMHG | OXYGEN SATURATION: 98 % | RESPIRATION RATE: 16 BRPM | TEMPERATURE: 97.7 F | SYSTOLIC BLOOD PRESSURE: 108 MMHG | HEIGHT: 59 IN | HEART RATE: 77 BPM

## 2022-11-04 DIAGNOSIS — Z12.11 SPECIAL SCREENING FOR MALIGNANT NEOPLASMS, COLON: ICD-10-CM

## 2022-11-04 DIAGNOSIS — Z00.00 ROUTINE GENERAL MEDICAL EXAMINATION AT A HEALTH CARE FACILITY: Primary | ICD-10-CM

## 2022-11-04 DIAGNOSIS — Z12.31 ENCOUNTER FOR SCREENING MAMMOGRAM FOR MALIGNANT NEOPLASM OF BREAST: ICD-10-CM

## 2022-11-04 DIAGNOSIS — Z11.1 SCREENING EXAMINATION FOR PULMONARY TUBERCULOSIS: ICD-10-CM

## 2022-11-04 PROCEDURE — G0463 HOSPITAL OUTPT CLINIC VISIT: HCPCS

## 2022-11-04 PROCEDURE — 86481 TB AG RESPONSE T-CELL SUSP: CPT | Mod: ZL | Performed by: PHYSICIAN ASSISTANT

## 2022-11-04 PROCEDURE — 99396 PREV VISIT EST AGE 40-64: CPT | Performed by: PHYSICIAN ASSISTANT

## 2022-11-04 PROCEDURE — 36415 COLL VENOUS BLD VENIPUNCTURE: CPT | Mod: ZL | Performed by: PHYSICIAN ASSISTANT

## 2022-11-04 RX ORDER — VALACYCLOVIR HYDROCHLORIDE 1 G/1
TABLET, FILM COATED ORAL
COMMUNITY
Start: 2022-06-08

## 2022-11-04 ASSESSMENT — ENCOUNTER SYMPTOMS
NAUSEA: 0
MYALGIAS: 0
DIARRHEA: 0
JOINT SWELLING: 0
ARTHRALGIAS: 0
DIZZINESS: 0
ABDOMINAL PAIN: 0
PALPITATIONS: 0
CHILLS: 0
WEAKNESS: 0
HEMATURIA: 0
SHORTNESS OF BREATH: 0
HEARTBURN: 0
EYE PAIN: 0
FEVER: 0
DYSURIA: 0
HEADACHES: 0
SORE THROAT: 0
COUGH: 0
NERVOUS/ANXIOUS: 0
PARESTHESIAS: 0
HEMATOCHEZIA: 0
CONSTIPATION: 0
FREQUENCY: 0
BREAST MASS: 0

## 2022-11-04 ASSESSMENT — ANXIETY QUESTIONNAIRES
2. NOT BEING ABLE TO STOP OR CONTROL WORRYING: NOT AT ALL
IF YOU CHECKED OFF ANY PROBLEMS ON THIS QUESTIONNAIRE, HOW DIFFICULT HAVE THESE PROBLEMS MADE IT FOR YOU TO DO YOUR WORK, TAKE CARE OF THINGS AT HOME, OR GET ALONG WITH OTHER PEOPLE: NOT DIFFICULT AT ALL
7. FEELING AFRAID AS IF SOMETHING AWFUL MIGHT HAPPEN: NOT AT ALL
8. IF YOU CHECKED OFF ANY PROBLEMS, HOW DIFFICULT HAVE THESE MADE IT FOR YOU TO DO YOUR WORK, TAKE CARE OF THINGS AT HOME, OR GET ALONG WITH OTHER PEOPLE?: NOT DIFFICULT AT ALL
6. BECOMING EASILY ANNOYED OR IRRITABLE: NOT AT ALL
4. TROUBLE RELAXING: NOT AT ALL
1. FEELING NERVOUS, ANXIOUS, OR ON EDGE: NOT AT ALL
GAD7 TOTAL SCORE: 0
5. BEING SO RESTLESS THAT IT IS HARD TO SIT STILL: NOT AT ALL
7. FEELING AFRAID AS IF SOMETHING AWFUL MIGHT HAPPEN: NOT AT ALL
3. WORRYING TOO MUCH ABOUT DIFFERENT THINGS: NOT AT ALL

## 2022-11-04 ASSESSMENT — PATIENT HEALTH QUESTIONNAIRE - PHQ9
SUM OF ALL RESPONSES TO PHQ QUESTIONS 1-9: 0
SUM OF ALL RESPONSES TO PHQ QUESTIONS 1-9: 0
10. IF YOU CHECKED OFF ANY PROBLEMS, HOW DIFFICULT HAVE THESE PROBLEMS MADE IT FOR YOU TO DO YOUR WORK, TAKE CARE OF THINGS AT HOME, OR GET ALONG WITH OTHER PEOPLE: NOT DIFFICULT AT ALL

## 2022-11-04 ASSESSMENT — PAIN SCALES - GENERAL: PAINLEVEL: NO PAIN (0)

## 2022-11-04 NOTE — NURSING NOTE
Pt presents to clinic today for a physical. Needs a mantoux for work, prefers blood draw.       FOOD SECURITY SCREENING QUESTIONS:    The next two questions are to help us understand your food security.  If you are feeling you need any assistance in this area, we have resources available to support you today.    Hunger Vital Signs:  Within the past 12 months we worried whether our food would run out before we got money to buy more. Never  Within the past 12 months the food we bought just didn't last and we didn't have money to get more. Never        Advance care directive on file? yes  Advance care directive provided to patient? no     Medication Reconciliation: complete  Jagdish Iqbal, CATY,LPN on 11/4/2022 at 2:46 PM

## 2022-11-04 NOTE — PROGRESS NOTES
SUBJECTIVE:   CC: Yandy is an 46 year old who presents for preventive health visit.       Patient has been advised of split billing requirements and indicates understanding: Yes  Healthy Habits:     Getting at least 3 servings of Calcium per day:  Yes    Bi-annual eye exam:  Yes    Dental care twice a year:  Yes    Sleep apnea or symptoms of sleep apnea:  None    Diet:  Regular (no restrictions)    Frequency of exercise:  1 day/week    Duration of exercise:  30-45 minutes    Taking medications regularly:  Yes    Medication side effects:  Not applicable    PHQ-2 Total Score: 0    Additional concerns today:  No    Patient's last menstrual period was 10/21/2022 (exact date).   Contraception: none  Risk for STI?: none  Last pap: 2021 - normal pap and HPV, repeat in 5 years  Any hx of abnormal paps:  One many years ago  FH of early CA?: no  Cholesterol/DM concerns/screenin  Tobacco?: no  Lung cancer screening: na  Calcium intake: some  DEXA: na  Last mammo: 2017  Colonoscopy: none  Hepatitis C screen: 2021   HIV screen: 2021   Immunizations: needs Tdap in 2023    Patient states that she needs tuberculosis screening for her new job.  Interested in the blood test.  No symptoms.  No known exposures.  Asymptomatic.      Today's PHQ-2 Score:   PHQ-2 (  Pfizer) 2022   Q1: Little interest or pleasure in doing things 0   Q2: Feeling down, depressed or hopeless 0   PHQ-2 Score 0   PHQ-2 Total Score (12-17 Years)- Positive if 3 or more points; Administer PHQ-A if positive -   Q1: Little interest or pleasure in doing things Not at all   Q2: Feeling down, depressed or hopeless Not at all   PHQ-2 Score 0       Abuse: Current or Past (Physical, Sexual or Emotional) - No  Do you feel safe in your environment? Yes        Social History     Tobacco Use     Smoking status: Never     Smokeless tobacco: Never   Substance Use Topics     Alcohol use: Yes     Comment: 2 drinks per month         Alcohol  Use 2022   Prescreen: >3 drinks/day or >7 drinks/week? No   Prescreen: >3 drinks/day or >7 drinks/week? -       Reviewed orders with patient.  Reviewed health maintenance and updated orders accordingly - Yes  Labs reviewed in EPIC  BP Readings from Last 3 Encounters:   22 108/70   21 110/60   21 102/62    Wt Readings from Last 3 Encounters:   22 70 kg (154 lb 6.4 oz)   21 68.2 kg (150 lb 6.4 oz)   21 65.8 kg (145 lb)                  Patient Active Problem List   Diagnosis     Acid reflux disease     Allergic rhinitis     Pain in thoracic spine     Hemorrhoids     Past Surgical History:   Procedure Laterality Date      SECTION      , Section      SECTION      2007, at [facility], delivered at 36 weeks.  Planned due to one of the twins with coarctation of aorta & spina bifida     LAPAROSCOPIC TUBAL LIGATION      No Comments Provided     OTHER SURGICAL HISTORY      ,[id number],WRIST FRACTURE [place]     OTHER SURGICAL HISTORY      ,48510.0,[place] ANES KIDNEY PROX URETER SURG     OTHER SURGICAL HISTORY      371957,ANESTHESIA ALERT,Notes prior complications from anesthesia: 'takes a long time to wake up     OTHER SURGICAL HISTORY      ,[id number],LIPECTOMY,with repair of diastasis recti       Social History     Tobacco Use     Smoking status: Never     Smokeless tobacco: Never   Substance Use Topics     Alcohol use: Yes     Comment: 2 drinks per month     Family History   Problem Relation Age of Onset     Family History Negative Mother         Good Health     Family History Negative Father         Good Health     Other - See Comments Sister         fluid retension in legs     Family History Negative Brother         Good Health     Family History Negative Brother         Good Health     Family History Negative Brother         Good Health     Arthritis Maternal Grandmother         Arthritis     Breast Cancer Maternal Grandmother  50        Cancer-breast,in her 50s     Diabetes Maternal Grandmother         Diabetes     Heart Disease Maternal Grandmother         Heart Disease     Hypertension Maternal Grandmother         Hypertension     Hyperlipidemia Maternal Grandmother         Hyperlipidemia     Osteoporosis Maternal Grandmother         Osteoporosis     Other - See Comments Maternal Grandmother         Stroke     Prostate Cancer Maternal Grandfather         Cancer-prostate     Cancer Paternal Grandmother      Other - See Comments Paternal Grandfather         Stroke     Other - See Comments Daughter         coarctation of aorta, spina bifida, Schone's syndrome, pacemaker         Current Outpatient Medications   Medication Sig Dispense Refill     miconazole (MONISTAT 1 DAY OR NIGHT) 1200 & 2 MG & % kit Use per package directions 1 kit 1     Multiple Vitamins-Minerals (MULTILEX-T&M) TABS        valACYclovir (VALTREX) 1000 mg tablet TAKE TWO TABLETS BY MOUTH AT ONSET OF SYMPTOMS AND 12 HOURS LATER       No Known Allergies  Recent Labs   Lab Test 08/26/21  0812   *   HDL 58   TRIG 47   ALT 10   CR 0.67   GFRESTIMATED >90   POTASSIUM 4.5        Breast Cancer Screening:  Any new diagnosis of family breast, ovarian, or bowel cancer? No    FHS-7: No flowsheet data found.    Mammogram Screening: Recommended annual mammography  Pertinent mammograms are reviewed under the imaging tab.    History of abnormal Pap smear: NO - age 30-65 PAP every 5 years with negative HPV co-testing recommended  PAP / HPV Latest Ref Rng & Units 8/16/2021   PAP   Negative for Intraepithelial Lesion or Malignancy (NILM)   HPV16 Negative Negative   HPV18 Negative Negative   HRHPV Negative Negative     Reviewed and updated as needed this visit by clinical staff   Tobacco  Allergies  Meds  Problems  Med Hx  Surg Hx  Fam Hx  Soc   Hx        Reviewed and updated as needed this visit by Provider   Tobacco  Allergies  Meds  Problems  Med Hx  Surg Hx  Fam Hx          Past Medical History:   Diagnosis Date     Allergic rhinitis     No Comments Provided     Closed displaced fracture of lesser toe of left foot     No Comments Provided     Closed displaced fracture of triquetral bone     bilateral wrist fractures at age 17     Encounter for sterilization     [facility]     Hemorrhoids     2007     Other specified disorders of kidney and ureter     ,as child, had recurrent UTIs as a child     Personal history of other medical treatment (CODE)     Para 2-1-0-4  - twins at 34 weeks and 36 weeks     Severe pre-eclampsia     HELLP syndrome.  Delivered at 33 weeks.      Past Surgical History:   Procedure Laterality Date      SECTION      , Section      SECTION      2007, at [facility], delivered at 36 weeks.  Planned due to one of the twins with coarctation of aorta & spina bifida     LAPAROSCOPIC TUBAL LIGATION      No Comments Provided     OTHER SURGICAL HISTORY      ,[id number],WRIST FRACTURE [place]     OTHER SURGICAL HISTORY      ,55536.0,[place] ANES KIDNEY PROX URETER SURG     OTHER SURGICAL HISTORY      625291,ANESTHESIA ALERT,Notes prior complications from anesthesia: 'takes a long time to wake up     OTHER SURGICAL HISTORY      ,[id number],LIPECTOMY,with repair of diastasis recti     OB History    Para Term  AB Living   2 2 0 2 0 4   SAB IAB Ectopic Multiple Live Births   0 0 0 2 4      # Outcome Date GA Lbr Mook/2nd Weight Sex Delivery Anes PTL Lv   2A  07 36w0d   F CS-LTranv   KHURRAM      Birth Comments: twin gestation, one baby with spinabifida & coarctation of aorta   2B  07 36w0d   F CS-LTranv   KHURRAM   1A  04 33w0d  2.223 kg (4 lb 14.4 oz) M CS-Unspec   KHURRAM      Birth Comments: PTL, HEELP Syndrome,       Apgar1: 8  Apgar5: 9   1B  04 33w0d  1.878 kg (4 lb 2.2 oz) M CS-Unspec   KHURRAM      Apgar1: 7  Apgar5: 8       Review of Systems  "  Constitutional: Negative for chills and fever.   HENT: Negative for congestion, ear pain, hearing loss and sore throat.    Eyes: Negative for pain and visual disturbance.   Respiratory: Negative for cough and shortness of breath.    Cardiovascular: Negative for chest pain, palpitations and peripheral edema.   Gastrointestinal: Negative for abdominal pain, constipation, diarrhea, heartburn, hematochezia and nausea.   Breasts:  Negative for tenderness, breast mass and discharge.   Genitourinary: Negative for dysuria, frequency, genital sores, hematuria, pelvic pain, urgency, vaginal bleeding and vaginal discharge.   Musculoskeletal: Negative for arthralgias, joint swelling and myalgias.   Skin: Negative for rash.   Neurological: Negative for dizziness, weakness, headaches and paresthesias.   Psychiatric/Behavioral: Negative for mood changes. The patient is not nervous/anxious.       OBJECTIVE:   /70 (BP Location: Right arm, Patient Position: Sitting, Cuff Size: Adult Regular)   Pulse 77   Temp 97.7  F (36.5  C) (Tympanic)   Resp 16   Ht 1.499 m (4' 11\")   Wt 70 kg (154 lb 6.4 oz)   LMP 10/21/2022 (Exact Date)   SpO2 98%   BMI 31.19 kg/m    Physical Exam  GENERAL: healthy, alert and no distress  EYES: Eyes grossly normal to inspection, PERRL and conjunctivae and sclerae normal  HENT: ear canals and TM's normal, nose and mouth without ulcers or lesions  NECK: no adenopathy, no asymmetry, masses, or scars and thyroid normal to palpation  RESP: lungs clear to auscultation - no rales, rhonchi or wheezes  CV: regular rate and rhythm, normal S1 S2, no S3 or S4, no murmur, click or rub, no peripheral edema and peripheral pulses strong  ABDOMEN: soft, nontender, no hepatosplenomegaly, no masses and bowel sounds normal  MS: no gross musculoskeletal defects noted, no edema  SKIN: no suspicious lesions or rashes  NEURO: Normal strength and tone, mentation intact and speech normal  PSYCH: mentation appears normal, " "affect normal/bright    Diagnostic Test Results:  Labs reviewed in Epic    ASSESSMENT/PLAN:       ICD-10-CM    1. Routine general medical examination at a health care facility  Z00.00       2. Screening examination for pulmonary tuberculosis  Z11.1 Quantiferon-TB Gold Plus     Quantiferon-TB Gold Plus      3. Encounter for screening mammogram for malignant neoplasm of breast  Z12.31 MA Screen Bilateral w/Radu      4. Special screening for malignant neoplasms, colon  Z12.11 COLOGUARD(EXACT SCIENCES)        Ordered Cologuard for colon cancer screening.  Ordered mammogram for breast cancer screening.  Ordered tuberculosis blood test to rule out infection concerns for work.    Encourage continued good diet and exercise.  Gave patient education.  Repeat physical in 1 year for monitoring.    Encouraged to set up a nurse only appointment for an updated Tdap vaccine by January.    Patient has been advised of split billing requirements and indicates understanding: Yes      COUNSELING:  Reviewed preventive health counseling, as reflected in patient instructions       Regular exercise       Healthy diet/nutrition       Vision screening       Hearing screening       Osteoporosis prevention/bone health       Safe sex practices/STD prevention       Colorectal Cancer Screening    Estimated body mass index is 31.19 kg/m  as calculated from the following:    Height as of this encounter: 1.499 m (4' 11\").    Weight as of this encounter: 70 kg (154 lb 6.4 oz).    Weight management plan: Discussed healthy diet and exercise guidelines    She reports that she has never smoked. She has never used smokeless tobacco.      Counseling Resources:  ATP IV Guidelines  Pooled Cohorts Equation Calculator  Breast Cancer Risk Calculator  BRCA-Related Cancer Risk Assessment: FHS-7 Tool  FRAX Risk Assessment  ICSI Preventive Guidelines  Dietary Guidelines for Americans, 2010  USDA's MyPlate  ASA Prophylaxis  Lung CA Screening    Ne Cook, " TWAN  [facility]  Answers for HPI/ROS submitted by the patient on 11/4/2022  If you checked off any problems, how difficult have these problems made it for you to do your work, take care of things at home, or get along with other people?: Not difficult at all  PHQ9 TOTAL SCORE: 0  JOVAN 7 TOTAL SCORE: 0

## 2022-11-04 NOTE — PATIENT INSTRUCTIONS
"Healthy Strategies  Eat at least 3 meals a day and never skip breakfast.  Eat more slowly.  Decrease portion size.  Provide structure by using meal replacement bars or shakes, and/or low calorie frozen meals.  For good nutrition incorporate fruit, vegetables, whole grains, lean protein, and low-fat dairy.  Remove trigger foods from yourenvironment to avoid impulse eating.  Increase physical activity: get a pedometer and aim for 10,000 steps a day or 30-35 minutes of activity 5 days per week.  Weigh yourself daily or at least weekly.  Keep a record of what you eat and your activity.  Establish a support system such as afriend, group or program.    11. Read Fili Villanueva's \"Eat to Live\". Remember it is important to have a minimum of 1200 calories a day, okay to use olive oil, 40 grams of fiber daily. No more than two servings (the size of your palm) of red meat a week.     Please consider the following general health recommendations:    Eat a quality diet (generally, low in simple sugars, starches, cholesterol and saturated fat.)    Please get 1200 mg of calcium in divided doses with 800 units vitamin D in your diet daily. Take supplements as needed to obtain full recommended amounts.     Stay physically active. Regular walking or other exercise is one of the best ways to minimize pain of arthritis; maintain independence and mobility; maintain bone strength; maintain conditioning of your heart. Find something you enjoy and a friend to do it with you.    Maintain ideal weight. Your Body mass index is There is no height or weight on file to calculate BMI.. Generally a BMI of 20-25 is considered ideal. Overweight is defined as 25-30, obese is 30-35 and markedly obese is greater than 35.    Apply sun block (SPF 25 or greater) on exposed skin anytime you are out in the sun to prevent skin cancer.     Wear a seatbelt whenever you are in a car.    Obtain a flu shot every fall.    You should have a tetanus booster at least " once every 10 years.    Schedule a mammogram annually starting at the age of 40 years old unless recommended earlier by your primary care provider. Come for a general exam once yearly.    Colonoscopy (an exam of the colon) is recommended every 10 years after the age of 45 to screen for colon cancer. (More often if you are at increased risk.)    Check blood sugar annually. Cholesterol annually unless you have had a normal level when last checked within 5 years.     I recommend that you have a general physical exam every year. You should have a pap test every 3 years between the ages of 21 and 30 and every 3-5 years between the ages of 30 and 65 depending on your test unless you have had previous abnormal pap smears, (in these cases the exams and PAP's should be done on a schedule as recommended by your primary care provider). If you have had hysterectomy in the past, your future Pap plan may be different.      Preventive Health Recommendations  Female Ages 40 to 49    Yearly exam:   See your health care provider every year in order to  Review health changes.   Discuss preventive care.    Review your medicines if your doctor prescribed any.    Get a Pap test every three years (unless you have an abnormal result and your provider advises testing more often).    If you get Pap tests with HPV test, you only need to test every 5 years, unless you have an abnormal result. You do not need a Pap test if your uterus was removed (hysterectomy) and you have not had cancer.    You should be tested each year for STDs (sexually transmitted diseases), if you're at risk.   Ask your doctor if you should have a mammogram.    Have a colonoscopy (test for colon cancer) if someone in your family has had colon cancer or polyps before age 50.     Have a cholesterol test every 5 years.     Have a diabetes test (fasting glucose) after age 45. If you are at risk for diabetes, you should have this test every 3 years.    Shots: Get a flu shot  each year. Get a tetanus shot every 10 years.     Nutrition:   Eat at least 5 servings of fruits and vegetables each day.  Eat whole-grain bread, whole-wheat pasta and brown rice instead of white grains and rice.  Get adequate Calcium and Vitamin D.      Lifestyle  Exercise at least 150 minutes a week (an average of 30 minutes a day, 5 days a week). This will help you control your weight and prevent disease.  Limit alcohol to one drink per day.  No smoking.   Wear sunscreen to prevent skin cancer.  See your dentist every six months for an exam and cleaning.      Cologuard Patient Instructions    You received an order for a Cologuard test. You will receive your kit in the mail. Please follow the instructions that are provided in the kit.      Reminder: Ship Cologuard test the same day or the next day to allow enough delivery time. The lab must receive your specimen within 4 days for successful testing. If not delivered in time, you may have to complete the process again.    Home Pick-up:  Once you complete the kit, [phone] and they will schedule a UPS pickup for you.    OR    Drop Off Locations:  Once you have completed the collection and have it ready to be shipped, bring it to one of the following sites listed below. *Note: none of the sites ship out later than mid-morning. Please do not drop off Fridays, as they will not go out until Monday which will make your specimen inacceptable.     - [facility] ([address], [place] [address])      Drop off: Monday-Thursday, 8:00am-4:30pm at the [address] check-in desk      - [facility] ([address], [place] [address])   Drop off: Monday-Thursday, 8:00am-5:45pm     - [facility] ([address], [place] [address])     Drop off: Monday-Thursday, 3:00pm-5:30pm

## 2022-11-06 LAB
GAMMA INTERFERON BACKGROUND BLD IA-ACNC: 0.07 IU/ML
M TB IFN-G BLD-IMP: NEGATIVE
M TB IFN-G CD4+ BCKGRND COR BLD-ACNC: 9.93 IU/ML
MITOGEN IGNF BCKGRD COR BLD-ACNC: -0.01 IU/ML
MITOGEN IGNF BCKGRD COR BLD-ACNC: 0 IU/ML
QUANTIFERON MITOGEN: 10 IU/ML
QUANTIFERON NIL TUBE: 0.07 IU/ML
QUANTIFERON TB1 TUBE: 0.07 IU/ML
QUANTIFERON TB2 TUBE: 0.06

## 2022-11-23 ENCOUNTER — HOSPITAL ENCOUNTER (OUTPATIENT)
Dept: MAMMOGRAPHY | Facility: OTHER | Age: 47
Discharge: HOME | End: 2022-11-23
Attending: PHYSICIAN ASSISTANT | Admitting: PHYSICIAN ASSISTANT
Payer: COMMERCIAL

## 2022-11-23 DIAGNOSIS — Z12.31 ENCOUNTER FOR SCREENING MAMMOGRAM FOR MALIGNANT NEOPLASM OF BREAST: ICD-10-CM

## 2022-11-23 PROCEDURE — 77067 SCR MAMMO BI INCL CAD: CPT

## 2022-12-05 LAB — NONINV COLON CA DNA+OCC BLD SCRN STL QL: NEGATIVE

## 2023-01-28 ENCOUNTER — HEALTH MAINTENANCE LETTER (OUTPATIENT)
Age: 48
End: 2023-01-28

## 2023-03-10 ENCOUNTER — OFFICE VISIT (OUTPATIENT)
Dept: FAMILY MEDICINE | Facility: OTHER | Age: 48
End: 2023-03-10
Attending: FAMILY MEDICINE
Payer: COMMERCIAL

## 2023-03-10 VITALS
HEART RATE: 78 BPM | OXYGEN SATURATION: 98 % | WEIGHT: 159 LBS | RESPIRATION RATE: 20 BRPM | SYSTOLIC BLOOD PRESSURE: 108 MMHG | BODY MASS INDEX: 32.11 KG/M2 | TEMPERATURE: 97.6 F | DIASTOLIC BLOOD PRESSURE: 62 MMHG

## 2023-03-10 DIAGNOSIS — M25.521 RIGHT ELBOW PAIN: Primary | ICD-10-CM

## 2023-03-10 DIAGNOSIS — M77.01 MEDIAL EPICONDYLITIS OF ELBOW, RIGHT: ICD-10-CM

## 2023-03-10 PROCEDURE — 99213 OFFICE O/P EST LOW 20 MIN: CPT | Performed by: FAMILY MEDICINE

## 2023-03-10 PROCEDURE — G0463 HOSPITAL OUTPT CLINIC VISIT: HCPCS | Performed by: FAMILY MEDICINE

## 2023-03-10 ASSESSMENT — PAIN SCALES - GENERAL: PAINLEVEL: MILD PAIN (2)

## 2023-03-10 NOTE — NURSING NOTE
"Patient presents to the clinic for left elbow pain.    FOOD SECURITY SCREENING QUESTIONS:    The next two questions are to help us understand your food security.  If you are feeling you need any assistance in this area, we have resources available to support you today.    Hunger Vital Signs:  Within the past 12 months we worried whether our food would run out before we got money to buy more. Never  Within the past 12 months the food we bought just didn't last and we didn't have money to get more. Never    Advance Care Directive on file? yes  Advance Care Directive provided to patient? Declined.    Chief Complaint   Patient presents with     Musculoskeletal Problem       Initial /62 (BP Location: Left arm, Patient Position: Sitting, Cuff Size: Adult Large)   Pulse 78   Temp 97.6  F (36.4  C) (Tympanic)   Resp 20   Wt 72.1 kg (159 lb)   LMP 02/14/2023 (Approximate)   SpO2 98%   BMI 32.11 kg/m   Estimated body mass index is 32.11 kg/m  as calculated from the following:    Height as of 11/4/22: 1.499 m (4' 11\").    Weight as of this encounter: 72.1 kg (159 lb).  Medication Reconciliation: complete        Kelly Willis LPN       "

## 2023-03-10 NOTE — PROGRESS NOTES
Assessment & Plan       ICD-10-CM    1. Right elbow pain  M25.521 Occupational Therapy Referral      2. Medial epicondylitis of elbow, right  M77.01 Occupational Therapy Referral        She has right elbow pain and some degree of medial epicondylitis.  This does not seem to completely explain her pain.  Likely overuse at work is contributing, but hard to say as she is not noticing this worsening after work.  Also rides horses and has other aspects of life that could have contributed to pain, so not clearly work comp.  She has tried low-dose ibuprofen.  Likely will see improvement with higher NSAID dosing on a consistent basis to achieve an anti-inflammatory effect.  Can try naproxen for and 40 mg twice daily for 1 week to see if this helps.  Recommend using a forearm strap/epicondylitis brace during work to see if this helps.  Referral placed occupational therapy for further treatment.    Shayan Bhatti MD   [facility] AND [facility]       Casey Kebede is a 47 year old, presenting for the following health issues:  Musculoskeletal Problem      Musculoskeletal Problem    History of Present Illness       Reason for visit:  Elbow pain  Symptom onset:  3-4 weeks ago  Symptoms include:  Elbow  Symptom intensity:  Mild  Symptom progression:  Staying the same  Had these symptoms before:  Yes  Has tried/received treatment for these symptoms:  Yes  Previous treatment was successful:  Yes  Prior treatment description:  Pt  What makes it worse:  Work  What makes it better:  Ice sometimes    She eats 2-3 servings of fruits and vegetables daily.She consumes 1 sweetened beverage(s) daily.She exercises with enough effort to increase her heart rate 20 to 29 minutes per day.  She exercises with enough effort to increase her heart rate 4 days per week.   She is taking medications regularly.     Right elbow pain no known injury  Works as a dental hygienist, but this does not seem to be aggravating it  Has not improved  with low-dose ibuprofen  Icing helps    Review of Systems   As above      Objective    /62 (BP Location: Left arm, Patient Position: Sitting, Cuff Size: Adult Large)   Pulse 78   Temp 97.6  F (36.4  C) (Tympanic)   Resp 20   Wt 72.1 kg (159 lb)   LMP 02/14/2023 (Approximate)   SpO2 98%   BMI 32.11 kg/m    Body mass index is 32.11 kg/m .  Physical Exam   General Appearance: Alert. No acute distress  Psychiatric: Normal affect and mentation  Musculoskeletal: No elbow swelling.  Tender over medial epicondyle and on medial elbow.  Also tender at the olecranon and triceps tendon.  No significant lateral epicondyle tenderness.  Resisted wrist flexion and extension do not aggravate pain.

## 2023-03-30 ENCOUNTER — TRANSFERRED RECORDS (OUTPATIENT)
Dept: HEALTH INFORMATION MANAGEMENT | Facility: OTHER | Age: 48
End: 2023-03-30
Payer: COMMERCIAL

## 2023-12-17 ENCOUNTER — HEALTH MAINTENANCE LETTER (OUTPATIENT)
Age: 48
End: 2023-12-17

## 2024-01-03 ENCOUNTER — HOSPITAL ENCOUNTER (OUTPATIENT)
Dept: MAMMOGRAPHY | Facility: OTHER | Age: 49
Discharge: HOME | End: 2024-01-03
Attending: FAMILY MEDICINE | Admitting: FAMILY MEDICINE
Payer: COMMERCIAL

## 2024-01-03 DIAGNOSIS — Z12.31 VISIT FOR SCREENING MAMMOGRAM: ICD-10-CM

## 2024-01-03 PROCEDURE — 77063 BREAST TOMOSYNTHESIS BI: CPT

## 2025-01-12 ENCOUNTER — HEALTH MAINTENANCE LETTER (OUTPATIENT)
Age: 50
End: 2025-01-12

## 2025-01-29 ENCOUNTER — HOSPITAL ENCOUNTER (OUTPATIENT)
Dept: MAMMOGRAPHY | Facility: OTHER | Age: 50
Discharge: HOME | End: 2025-01-29
Attending: FAMILY MEDICINE
Payer: COMMERCIAL

## 2025-01-29 DIAGNOSIS — Z12.31 VISIT FOR SCREENING MAMMOGRAM: ICD-10-CM

## 2025-01-29 PROCEDURE — 77063 BREAST TOMOSYNTHESIS BI: CPT

## 2025-02-02 ENCOUNTER — HOSPITAL ENCOUNTER (OUTPATIENT)
Dept: GENERAL RADIOLOGY | Facility: OTHER | Age: 50
Discharge: HOME | End: 2025-02-02
Payer: COMMERCIAL

## 2025-02-02 ENCOUNTER — OFFICE VISIT (OUTPATIENT)
Dept: FAMILY MEDICINE | Facility: OTHER | Age: 50
End: 2025-02-02
Attending: REGISTERED NURSE
Payer: COMMERCIAL

## 2025-02-02 VITALS
HEIGHT: 59 IN | DIASTOLIC BLOOD PRESSURE: 68 MMHG | RESPIRATION RATE: 16 BRPM | TEMPERATURE: 98.5 F | WEIGHT: 158 LBS | SYSTOLIC BLOOD PRESSURE: 104 MMHG | BODY MASS INDEX: 31.85 KG/M2 | HEART RATE: 89 BPM | OXYGEN SATURATION: 97 %

## 2025-02-02 DIAGNOSIS — J22 LOWER RESPIRATORY INFECTION: Primary | ICD-10-CM

## 2025-02-02 DIAGNOSIS — R05.1 ACUTE COUGH: ICD-10-CM

## 2025-02-02 DIAGNOSIS — R06.2 WHEEZING: ICD-10-CM

## 2025-02-02 PROCEDURE — 71046 X-RAY EXAM CHEST 2 VIEWS: CPT

## 2025-02-02 PROCEDURE — 99213 OFFICE O/P EST LOW 20 MIN: CPT

## 2025-02-02 RX ORDER — AZITHROMYCIN 250 MG/1
TABLET, FILM COATED ORAL
Qty: 6 TABLET | Refills: 0 | Status: SHIPPED | OUTPATIENT
Start: 2025-02-02 | End: 2025-02-07

## 2025-02-02 ASSESSMENT — ENCOUNTER SYMPTOMS
COUGH: 1
NAUSEA: 0
SORE THROAT: 0
VOMITING: 0
SINUS PRESSURE: 0
DIARRHEA: 0
SHORTNESS OF BREATH: 1
SINUS PAIN: 0
CARDIOVASCULAR NEGATIVE: 1
FEVER: 0
MUSCULOSKELETAL NEGATIVE: 1

## 2025-02-02 ASSESSMENT — PAIN SCALES - GENERAL: PAINLEVEL_OUTOF10: MILD PAIN (2)

## 2025-02-02 NOTE — NURSING NOTE
"Chief Complaint   Patient presents with    Cough     X 8 days- wheezing    Nasal Congestion     Patient tx with day/nighquil  Concerned with pneumonia- wheezing started 2 days- worsening on L side.  Wheezing on L side during exhale.    Initial /68 (BP Location: Left arm, Patient Position: Sitting, Cuff Size: Adult Regular)   Pulse 89   Temp 98.5  F (36.9  C) (Tympanic)   Resp 16   Ht 1.499 m (4' 11\")   Wt 71.7 kg (158 lb)   LMP 01/20/2025 (Approximate)   SpO2 97%   BMI 31.91 kg/m   Estimated body mass index is 31.91 kg/m  as calculated from the following:    Height as of this encounter: 1.499 m (4' 11\").    Weight as of this encounter: 71.7 kg (158 lb).     Advance Care Directive on file? y    FOOD SECURITY SCREENING QUESTIONS:    The next two questions are to help us understand your food security.  If you are feeling you need any assistance in this area, we have resources available to support you today.    Hunger Vital Signs:  Within the past 12 months we worried whether our food would run out before we got money to buy more. Never  Within the past 12 months the food we bought just didn't last and we didn't have money to get more. Never  Ginger Lancaster LPN,CATY on 2/2/2025 at 11:46 AM      Ginger Lancaster LPN     "